# Patient Record
Sex: FEMALE | Race: ASIAN | URBAN - METROPOLITAN AREA
[De-identification: names, ages, dates, MRNs, and addresses within clinical notes are randomized per-mention and may not be internally consistent; named-entity substitution may affect disease eponyms.]

---

## 2019-05-14 NOTE — LETTER
Faina Jones, :1975    CONSENT FOR PROCEDURE/SEDATION    1. I authorize the performance upon Faina Jones  the following: Endometrial biopsy procedure    2. I authorize Dr. Diana Dean MD (and whomever is designated as the doctor’s assistant), to perform the above-mentioned procedures.    3. If any unforeseen conditions arise during this procedure calling for additional  procedures, operations, or medications (including anesthesia and blood transfusion), I further request and authorize the doctor to do whatever he/she deems advisable in my interest.    4. I consent to the taking and reproduction of any photographs in the course of this procedure for professional purposes.    5. I consent to the administration of such sedation as may be considered necessary or advisable by the physician responsible for this service, with the exception of ______________________________________________________    6. I have been informed by my doctor of the nature and purpose of this procedure sedation, possible alternative methods of treatment, risk involved and possible complications.    7. If I have a Do Not Resuscitate (DNR) order in place, the physician and I (or the individual authorized to consent on my behalf) will discuss and agree as to whether the Do Not Resuscitate (DNR) order will remain in effect or will be discontinued during the performance of the procedure and the applicable recovery period. If the Do Not Resuscitate (DNR) order is discontinued and is to be reinstated following the procedure/recovery period, the physician will determine when the applicable recovery period ends for purposes of reinstating the Do Not Resuscitate (DNR) order.    Signature of Patient:_______________________________________________    Signature of person authorized to consent for patient:  _______________________________________________________________    Relationship to patient:  ____________________________________________    Witness: _________________________________________ Date:___________     Physician Signature: _______________________________ Date:___________     Nausea and vomiting that does not stop/Pain not relieved by Medications/Bleeding that does not stop/Fever greater than (need to indicate Fahrenheit or Celsius)/Wound/Surgical Site with redness, or foul smelling discharge or pus/Numbness, tingling, color or temperature change to extremity/Inability to tolerate liquids or foods/Swelling that gets worse Swelling that gets worse/Fever greater than (need to indicate Fahrenheit or Celsius)/Wound/Surgical Site with redness, or foul smelling discharge or pus/Numbness, tingling, color or temperature change to extremity/Bleeding that does not stop/Pain not relieved by Medications/Inability to tolerate liquids or foods/Unable to urinate/Increased irritability or sluggishness/Nausea and vomiting that does not stop

## 2022-03-07 ENCOUNTER — OFFICE VISIT (OUTPATIENT)
Dept: FAMILY MEDICINE CLINIC | Facility: CLINIC | Age: 47
End: 2022-03-07
Payer: COMMERCIAL

## 2022-03-07 VITALS
DIASTOLIC BLOOD PRESSURE: 80 MMHG | RESPIRATION RATE: 16 BRPM | OXYGEN SATURATION: 99 % | TEMPERATURE: 98 F | HEIGHT: 59.5 IN | BODY MASS INDEX: 34.02 KG/M2 | SYSTOLIC BLOOD PRESSURE: 124 MMHG | HEART RATE: 88 BPM | WEIGHT: 171 LBS

## 2022-03-07 DIAGNOSIS — D50.8 OTHER IRON DEFICIENCY ANEMIA: ICD-10-CM

## 2022-03-07 DIAGNOSIS — K04.7 TOOTH INFECTION: ICD-10-CM

## 2022-03-07 DIAGNOSIS — M67.912 ROTATOR CUFF DYSFUNCTION, LEFT: Primary | ICD-10-CM

## 2022-03-07 DIAGNOSIS — G89.29 CHRONIC LEFT SHOULDER PAIN: ICD-10-CM

## 2022-03-07 DIAGNOSIS — M25.512 CHRONIC LEFT SHOULDER PAIN: ICD-10-CM

## 2022-03-07 DIAGNOSIS — Z12.31 VISIT FOR SCREENING MAMMOGRAM: ICD-10-CM

## 2022-03-07 PROCEDURE — 3074F SYST BP LT 130 MM HG: CPT | Performed by: FAMILY MEDICINE

## 2022-03-07 PROCEDURE — 99204 OFFICE O/P NEW MOD 45 MIN: CPT | Performed by: FAMILY MEDICINE

## 2022-03-07 PROCEDURE — 3008F BODY MASS INDEX DOCD: CPT | Performed by: FAMILY MEDICINE

## 2022-03-07 PROCEDURE — 3079F DIAST BP 80-89 MM HG: CPT | Performed by: FAMILY MEDICINE

## 2022-03-07 RX ORDER — METHOCARBAMOL 750 MG/1
1 TABLET, FILM COATED ORAL 2 TIMES DAILY
COMMUNITY
Start: 2022-02-25

## 2022-03-07 RX ORDER — AMOXICILLIN AND CLAVULANATE POTASSIUM 875; 125 MG/1; MG/1
1 TABLET, FILM COATED ORAL 2 TIMES DAILY
Qty: 14 TABLET | Refills: 0 | Status: SHIPPED | OUTPATIENT
Start: 2022-03-07 | End: 2022-03-14

## 2022-03-07 RX ORDER — TIZANIDINE 4 MG/1
4 TABLET ORAL NIGHTLY
COMMUNITY
Start: 2022-02-07

## 2022-03-07 RX ORDER — MELOXICAM 15 MG/1
15 TABLET ORAL DAILY PRN
Qty: 30 TABLET | Refills: 0 | Status: SHIPPED | OUTPATIENT
Start: 2022-03-07 | End: 2022-04-06

## 2022-03-30 ENCOUNTER — TELEPHONE (OUTPATIENT)
Dept: PHYSICAL THERAPY | Facility: HOSPITAL | Age: 47
End: 2022-03-30

## 2022-04-01 ENCOUNTER — TELEPHONE (OUTPATIENT)
Dept: PHYSICAL THERAPY | Facility: HOSPITAL | Age: 47
End: 2022-04-01

## 2022-04-04 ENCOUNTER — APPOINTMENT (OUTPATIENT)
Dept: PHYSICAL THERAPY | Age: 47
End: 2022-04-04
Attending: FAMILY MEDICINE
Payer: COMMERCIAL

## 2022-04-05 RX ORDER — MELOXICAM 15 MG/1
TABLET ORAL
Qty: 30 TABLET | Refills: 2 | Status: SHIPPED | OUTPATIENT
Start: 2022-04-05

## 2022-04-08 ENCOUNTER — OFFICE VISIT (OUTPATIENT)
Dept: PHYSICAL THERAPY | Age: 47
End: 2022-04-08
Attending: FAMILY MEDICINE
Payer: COMMERCIAL

## 2022-04-08 PROCEDURE — 97110 THERAPEUTIC EXERCISES: CPT

## 2022-04-08 PROCEDURE — 97162 PT EVAL MOD COMPLEX 30 MIN: CPT

## 2022-04-11 ENCOUNTER — OFFICE VISIT (OUTPATIENT)
Dept: PHYSICAL THERAPY | Age: 47
End: 2022-04-11
Attending: FAMILY MEDICINE
Payer: COMMERCIAL

## 2022-04-11 PROCEDURE — 97140 MANUAL THERAPY 1/> REGIONS: CPT

## 2022-04-11 PROCEDURE — 97110 THERAPEUTIC EXERCISES: CPT

## 2022-04-15 ENCOUNTER — OFFICE VISIT (OUTPATIENT)
Dept: PHYSICAL THERAPY | Age: 47
End: 2022-04-15
Attending: FAMILY MEDICINE
Payer: COMMERCIAL

## 2022-04-15 PROCEDURE — 97110 THERAPEUTIC EXERCISES: CPT

## 2022-04-18 ENCOUNTER — OFFICE VISIT (OUTPATIENT)
Dept: PHYSICAL THERAPY | Age: 47
End: 2022-04-18
Attending: FAMILY MEDICINE
Payer: COMMERCIAL

## 2022-04-18 PROCEDURE — 97110 THERAPEUTIC EXERCISES: CPT

## 2022-04-18 PROCEDURE — 97140 MANUAL THERAPY 1/> REGIONS: CPT

## 2022-04-20 ENCOUNTER — TELEPHONE (OUTPATIENT)
Dept: PHYSICAL THERAPY | Facility: HOSPITAL | Age: 47
End: 2022-04-20

## 2022-04-22 ENCOUNTER — OFFICE VISIT (OUTPATIENT)
Dept: PHYSICAL THERAPY | Age: 47
End: 2022-04-22
Attending: FAMILY MEDICINE
Payer: COMMERCIAL

## 2022-04-22 PROCEDURE — 97110 THERAPEUTIC EXERCISES: CPT

## 2022-04-25 ENCOUNTER — OFFICE VISIT (OUTPATIENT)
Dept: PHYSICAL THERAPY | Age: 47
End: 2022-04-25
Attending: FAMILY MEDICINE
Payer: COMMERCIAL

## 2022-04-25 PROCEDURE — 97110 THERAPEUTIC EXERCISES: CPT

## 2022-04-25 PROCEDURE — 97140 MANUAL THERAPY 1/> REGIONS: CPT

## 2022-04-29 ENCOUNTER — TELEPHONE (OUTPATIENT)
Dept: PHYSICAL THERAPY | Facility: HOSPITAL | Age: 47
End: 2022-04-29

## 2022-04-29 ENCOUNTER — APPOINTMENT (OUTPATIENT)
Dept: PHYSICAL THERAPY | Age: 47
End: 2022-04-29
Attending: FAMILY MEDICINE
Payer: COMMERCIAL

## 2022-05-03 ENCOUNTER — OFFICE VISIT (OUTPATIENT)
Dept: PHYSICAL THERAPY | Age: 47
End: 2022-05-03
Attending: FAMILY MEDICINE
Payer: COMMERCIAL

## 2022-05-03 PROCEDURE — 97110 THERAPEUTIC EXERCISES: CPT

## 2022-05-06 ENCOUNTER — APPOINTMENT (OUTPATIENT)
Dept: PHYSICAL THERAPY | Age: 47
End: 2022-05-06
Payer: COMMERCIAL

## 2022-07-12 ENCOUNTER — HOSPITAL ENCOUNTER (OUTPATIENT)
Dept: GENERAL RADIOLOGY | Age: 47
Discharge: HOME | End: 2022-07-12
Attending: FAMILY MEDICINE
Payer: COMMERCIAL

## 2022-07-12 ENCOUNTER — PATIENT MESSAGE (OUTPATIENT)
Dept: FAMILY MEDICINE CLINIC | Facility: CLINIC | Age: 47
End: 2022-07-12

## 2022-07-12 ENCOUNTER — OFFICE VISIT (OUTPATIENT)
Dept: FAMILY MEDICINE CLINIC | Facility: CLINIC | Age: 47
End: 2022-07-12
Payer: COMMERCIAL

## 2022-07-12 ENCOUNTER — LAB ENCOUNTER (OUTPATIENT)
Dept: LAB | Age: 47
End: 2022-07-12
Attending: FAMILY MEDICINE
Payer: COMMERCIAL

## 2022-07-12 VITALS
SYSTOLIC BLOOD PRESSURE: 122 MMHG | TEMPERATURE: 98 F | DIASTOLIC BLOOD PRESSURE: 70 MMHG | RESPIRATION RATE: 16 BRPM | WEIGHT: 172 LBS | OXYGEN SATURATION: 98 % | HEIGHT: 59.5 IN | HEART RATE: 74 BPM | BODY MASS INDEX: 34.22 KG/M2

## 2022-07-12 DIAGNOSIS — Z01.818 PREOP EXAMINATION: ICD-10-CM

## 2022-07-12 DIAGNOSIS — M67.912 ROTATOR CUFF DYSFUNCTION, LEFT: ICD-10-CM

## 2022-07-12 DIAGNOSIS — Z01.818 PREOP EXAMINATION: Primary | ICD-10-CM

## 2022-07-12 LAB
ALBUMIN SERPL-MCNC: 3.4 G/DL (ref 3.4–5)
ALBUMIN/GLOB SERPL: 0.8 {RATIO} (ref 1–2)
ALP LIVER SERPL-CCNC: 79 U/L
ALT SERPL-CCNC: 20 U/L
ANION GAP SERPL CALC-SCNC: 4 MMOL/L (ref 0–18)
AST SERPL-CCNC: 12 U/L (ref 15–37)
BASOPHILS # BLD AUTO: 0.04 X10(3) UL (ref 0–0.2)
BASOPHILS NFR BLD AUTO: 0.7 %
BILIRUB SERPL-MCNC: 0.2 MG/DL (ref 0.1–2)
BUN BLD-MCNC: 8 MG/DL (ref 7–18)
CALCIUM BLD-MCNC: 8.8 MG/DL (ref 8.5–10.1)
CHLORIDE SERPL-SCNC: 110 MMOL/L (ref 98–112)
CHOLEST SERPL-MCNC: 178 MG/DL (ref ?–200)
CO2 SERPL-SCNC: 25 MMOL/L (ref 21–32)
CREAT BLD-MCNC: 0.77 MG/DL
DEPRECATED HBV CORE AB SER IA-ACNC: 9.7 NG/ML
EOSINOPHIL # BLD AUTO: 0.07 X10(3) UL (ref 0–0.7)
EOSINOPHIL NFR BLD AUTO: 1.3 %
ERYTHROCYTE [DISTWIDTH] IN BLOOD BY AUTOMATED COUNT: 13.5 %
FASTING PATIENT LIPID ANSWER: YES
FASTING STATUS PATIENT QL REPORTED: YES
GLOBULIN PLAS-MCNC: 4.1 G/DL (ref 2.8–4.4)
GLUCOSE BLD-MCNC: 91 MG/DL (ref 70–99)
HCT VFR BLD AUTO: 35.8 %
HDLC SERPL-MCNC: 70 MG/DL (ref 40–59)
HGB BLD-MCNC: 11.5 G/DL
IMM GRANULOCYTES # BLD AUTO: 0.03 X10(3) UL (ref 0–1)
IMM GRANULOCYTES NFR BLD: 0.5 %
IRON SATN MFR SERPL: 8 %
IRON SERPL-MCNC: 34 UG/DL
LDLC SERPL CALC-MCNC: 90 MG/DL (ref ?–100)
LYMPHOCYTES # BLD AUTO: 1.16 X10(3) UL (ref 1–4)
LYMPHOCYTES NFR BLD AUTO: 20.8 %
MCH RBC QN AUTO: 28.6 PG (ref 26–34)
MCHC RBC AUTO-ENTMCNC: 32.1 G/DL (ref 31–37)
MCV RBC AUTO: 89.1 FL
MONOCYTES # BLD AUTO: 0.28 X10(3) UL (ref 0.1–1)
MONOCYTES NFR BLD AUTO: 5 %
NEUTROPHILS # BLD AUTO: 4 X10 (3) UL (ref 1.5–7.7)
NEUTROPHILS # BLD AUTO: 4 X10(3) UL (ref 1.5–7.7)
NEUTROPHILS NFR BLD AUTO: 71.7 %
NONHDLC SERPL-MCNC: 108 MG/DL (ref ?–130)
OSMOLALITY SERPL CALC.SUM OF ELEC: 286 MOSM/KG (ref 275–295)
PLATELET # BLD AUTO: 348 10(3)UL (ref 150–450)
POTASSIUM SERPL-SCNC: 4 MMOL/L (ref 3.5–5.1)
PROT SERPL-MCNC: 7.5 G/DL (ref 6.4–8.2)
RBC # BLD AUTO: 4.02 X10(6)UL
SODIUM SERPL-SCNC: 139 MMOL/L (ref 136–145)
T4 FREE SERPL-MCNC: 1.2 NG/DL (ref 0.8–1.7)
TIBC SERPL-MCNC: 441 UG/DL (ref 240–450)
TRANSFERRIN SERPL-MCNC: 296 MG/DL (ref 200–360)
TRIGL SERPL-MCNC: 103 MG/DL (ref 30–149)
TSI SER-ACNC: 0.51 MIU/ML (ref 0.36–3.74)
VLDLC SERPL CALC-MCNC: 17 MG/DL (ref 0–30)
WBC # BLD AUTO: 5.6 X10(3) UL (ref 4–11)

## 2022-07-12 PROCEDURE — 83550 IRON BINDING TEST: CPT | Performed by: FAMILY MEDICINE

## 2022-07-12 PROCEDURE — 80061 LIPID PANEL: CPT | Performed by: FAMILY MEDICINE

## 2022-07-12 PROCEDURE — 84443 ASSAY THYROID STIM HORMONE: CPT | Performed by: FAMILY MEDICINE

## 2022-07-12 PROCEDURE — 83540 ASSAY OF IRON: CPT | Performed by: FAMILY MEDICINE

## 2022-07-12 PROCEDURE — 82728 ASSAY OF FERRITIN: CPT | Performed by: FAMILY MEDICINE

## 2022-07-12 PROCEDURE — 84439 ASSAY OF FREE THYROXINE: CPT | Performed by: FAMILY MEDICINE

## 2022-07-12 PROCEDURE — 71046 X-RAY EXAM CHEST 2 VIEWS: CPT | Performed by: FAMILY MEDICINE

## 2022-07-12 PROCEDURE — 80050 GENERAL HEALTH PANEL: CPT | Performed by: FAMILY MEDICINE

## 2022-07-12 NOTE — TELEPHONE ENCOUNTER
From: Faina Jones  To:  Rekha Phillips DO  Sent: 7/12/2022 8:04 AM CDT  Subject: MRI ARTHROGRAM LEFT SHOULDER    Left shoulder report

## 2022-07-13 ENCOUNTER — PATIENT MESSAGE (OUTPATIENT)
Dept: FAMILY MEDICINE CLINIC | Facility: CLINIC | Age: 47
End: 2022-07-13

## 2022-07-14 ENCOUNTER — TELEPHONE (OUTPATIENT)
Dept: FAMILY MEDICINE CLINIC | Facility: CLINIC | Age: 47
End: 2022-07-14

## 2022-07-14 DIAGNOSIS — D50.8 OTHER IRON DEFICIENCY ANEMIA: Primary | ICD-10-CM

## 2022-07-14 NOTE — TELEPHONE ENCOUNTER
Spoke with Dr. Kye Suarez, pt may continue ferrous gluconate at current dose until surgery and then resume after surgery.

## 2022-07-14 NOTE — TELEPHONE ENCOUNTER
From: Faina Jones  To: Primitivo Barry DO  Sent: 7/13/2022 6:13 PM CDT  Subject: Low iron and ferritin level    Aoa Doc. Can you kindly let me know if I continue my ferrous gloconate 27 mg 2 times daily with a 1 tab daily (named as:Core 1) multi vitamin contain iron 9mg. Or should I stop it for my surgery on thursday july 21st 2022. Pls advice. Sending you pics of the supplements for your reference.   Thanks

## 2022-07-14 NOTE — TELEPHONE ENCOUNTER
Patient calling, patient received message about low iron from labs done on 7-12. Patient has upcoming procedure on 7-21 with Dr Larissa Sandoval, has questions about medication-how much to take, should she discontinue or ok to still take leading up to her surgery.  Please advise

## 2022-07-18 ENCOUNTER — TELEPHONE (OUTPATIENT)
Dept: FAMILY MEDICINE CLINIC | Facility: CLINIC | Age: 47
End: 2022-07-18

## 2022-07-18 NOTE — TELEPHONE ENCOUNTER
EKG tracing faxed to Dr. Christiano Rodrigez at [facility] AT [address] [place] [address], [phone], confirmation received.

## 2022-07-18 NOTE — TELEPHONE ENCOUNTER
- EKG tracing is needed to be faxed to 's office. Everything else was rec'd.       [facility] Fax [phone]

## 2022-07-25 ENCOUNTER — HOSPITAL ENCOUNTER (OUTPATIENT)
Dept: ULTRASOUND IMAGING | Age: 47
Discharge: HOME | End: 2022-07-25
Attending: PHYSICIAN ASSISTANT
Payer: COMMERCIAL

## 2022-07-25 DIAGNOSIS — R22.32 MASS OF UPPER LIMB, LEFT: ICD-10-CM

## 2022-07-25 PROCEDURE — 93971 EXTREMITY STUDY: CPT | Performed by: PHYSICIAN ASSISTANT

## 2022-08-12 ENCOUNTER — OFFICE VISIT (OUTPATIENT)
Dept: FAMILY MEDICINE CLINIC | Facility: CLINIC | Age: 47
End: 2022-08-12
Payer: COMMERCIAL

## 2022-08-12 VITALS
DIASTOLIC BLOOD PRESSURE: 74 MMHG | BODY MASS INDEX: 35.41 KG/M2 | OXYGEN SATURATION: 99 % | HEART RATE: 72 BPM | HEIGHT: 59.5 IN | SYSTOLIC BLOOD PRESSURE: 124 MMHG | TEMPERATURE: 98 F | WEIGHT: 178 LBS | RESPIRATION RATE: 16 BRPM

## 2022-08-12 DIAGNOSIS — Z98.890 POST-OPERATIVE STATE: Primary | ICD-10-CM

## 2022-08-12 DIAGNOSIS — Z30.011 ENCOUNTER FOR INITIAL PRESCRIPTION OF CONTRACEPTIVE PILLS: ICD-10-CM

## 2022-08-12 DIAGNOSIS — Z12.31 ENCOUNTER FOR SCREENING MAMMOGRAM FOR MALIGNANT NEOPLASM OF BREAST: ICD-10-CM

## 2022-08-12 DIAGNOSIS — Z12.11 SCREENING FOR COLON CANCER: ICD-10-CM

## 2022-08-12 DIAGNOSIS — G89.18 POST-OP PAIN: ICD-10-CM

## 2022-08-12 PROCEDURE — 3074F SYST BP LT 130 MM HG: CPT | Performed by: FAMILY MEDICINE

## 2022-08-12 PROCEDURE — 99214 OFFICE O/P EST MOD 30 MIN: CPT | Performed by: FAMILY MEDICINE

## 2022-08-12 PROCEDURE — 3078F DIAST BP <80 MM HG: CPT | Performed by: FAMILY MEDICINE

## 2022-08-12 PROCEDURE — 3008F BODY MASS INDEX DOCD: CPT | Performed by: FAMILY MEDICINE

## 2022-08-12 RX ORDER — ACETAMINOPHEN AND CODEINE PHOSPHATE 300; 30 MG/1; MG/1
1 TABLET ORAL
COMMUNITY
Start: 2022-08-02 | End: 2022-08-12

## 2022-08-12 RX ORDER — ASPIRIN 81 MG/1
TABLET ORAL
COMMUNITY
Start: 2022-07-21

## 2022-08-12 RX ORDER — NORETHINDRONE ACETATE AND ETHINYL ESTRADIOL 1MG-20(21)
1 KIT ORAL DAILY
Qty: 28 TABLET | Refills: 2 | Status: SHIPPED | OUTPATIENT
Start: 2022-08-19 | End: 2023-08-19

## 2022-08-12 RX ORDER — ACETAMINOPHEN AND CODEINE PHOSPHATE 300; 30 MG/1; MG/1
1 TABLET ORAL
Qty: 30 TABLET | Refills: 0 | Status: SHIPPED | OUTPATIENT
Start: 2022-08-12

## 2022-08-12 RX ORDER — CYCLOBENZAPRINE HCL 5 MG
5 TABLET ORAL NIGHTLY PRN
Qty: 10 TABLET | Refills: 0 | Status: SHIPPED | OUTPATIENT
Start: 2022-08-12

## 2022-08-15 ENCOUNTER — PATIENT MESSAGE (OUTPATIENT)
Dept: FAMILY MEDICINE CLINIC | Facility: CLINIC | Age: 47
End: 2022-08-15

## 2022-08-16 NOTE — TELEPHONE ENCOUNTER
From: Faina Jones  To: Jihan Boo DO  Sent: 8/15/2022 4:42 PM CDT  Subject: screening Colonoscopy     Good evening   I got a call from colonoscopy department for scheduling appointment.  As you know I had surgery recently I am not ready for any further tests and screenings as I am in recovery phase. Pls confirm its a regular screening that I can do at later dates.   Hopefully you understand   Thanks  Faina

## 2022-08-17 ENCOUNTER — TELEPHONE (OUTPATIENT)
Dept: FAMILY MEDICINE CLINIC | Facility: CLINIC | Age: 47
End: 2022-08-17

## 2022-08-17 NOTE — TELEPHONE ENCOUNTER
Cologuard order with insurance card and face sheet faxed to [phone]. Copy of order sent to scan.

## 2022-08-17 NOTE — TELEPHONE ENCOUNTER
Patient calling, patient states she is still trying to recover post surgery in July. Patient also declines Cologuard at this time, will do test when better.

## 2022-08-20 ENCOUNTER — PATIENT MESSAGE (OUTPATIENT)
Dept: FAMILY MEDICINE CLINIC | Facility: CLINIC | Age: 47
End: 2022-08-20

## 2022-08-20 ENCOUNTER — TELEPHONE (OUTPATIENT)
Dept: FAMILY MEDICINE CLINIC | Facility: CLINIC | Age: 47
End: 2022-08-20

## 2022-08-20 DIAGNOSIS — U07.1 COVID-19 VIRUS INFECTION: Primary | ICD-10-CM

## 2022-08-20 LAB — AMB EXT COVID-19 RESULT: DETECTED

## 2022-08-20 NOTE — TELEPHONE ENCOUNTER
On call 11:31AM- tested positive for covid and asking for treatment. Had shoulder surgery 4 wks ago. No other chronic medical conditions. Tested positive today, symptoms started yesterday. Taking tylenol #3    Sxs: nasal congestion, cough   pulse ox 98%  Denies- fever    Rec- Advised her it is an option to take it. Pt hesitant as her sxs are mild.  Due to her obesity erxed prescription and advised her to start by day 5 if she chooses to take it.  -reviewed signs/sxs that would indicate need to call back/go to ER

## 2022-08-22 NOTE — TELEPHONE ENCOUNTER
From: Faina Jones  To: Tiffanie Mcnamara DO  Sent: 8/20/2022 3:27 PM CDT  Subject: Covid positive    Talked to On call Doctor. She prescribed Anti viral incase symptoms get worst can start within 5 days.   Symptoms started on Friday 19th August 2022

## 2022-08-29 ENCOUNTER — PATIENT MESSAGE (OUTPATIENT)
Dept: FAMILY MEDICINE CLINIC | Facility: CLINIC | Age: 47
End: 2022-08-29

## 2022-08-30 NOTE — TELEPHONE ENCOUNTER
From: Faina Jones  To: Mary Wilkinson DO  Sent: 8/29/2022 10:10 AM CDT  Subject: Post covid nausea and heartburn    Good morning Doctor,  Today is 11 day post covid. I have moderate to severe heartburn and nausea since few days(5-6 katt) after covid starts, no home remedy works. I drink peptobismol last night and today's morning, keep hydrating me  Bowl movement, soft to loose once or twice daily  Sometime watery sometime soft. Kindly prescribed or advise me some overcounter med.  For heartburn and nausea feeling  Thanks

## 2023-01-09 ENCOUNTER — TELEPHONE (OUTPATIENT)
Dept: FAMILY MEDICINE CLINIC | Facility: CLINIC | Age: 48
End: 2023-01-09

## 2023-01-09 NOTE — TELEPHONE ENCOUNTER
Pt came in with son for new patient appt today, wants to come in sooner than march appt. Stating she has been experiencing irregular bleeding that has been on and off over the last month or so. She does have history of fibroids.  Please advise    FYI- march appt is to establish care with   Future Appointments   Date Time Provider Yuliya Cash   1/30/2023 12:30 PM Kavin Arvizu MD [facility]   3/13/2023 10:00 AM aSpna Sen DO [facility]

## 2023-01-10 NOTE — TELEPHONE ENCOUNTER
Called patient and explained Dr. Young Bella is on JE right now. She should keep appt with OB/GYN and then we will see her when Dr. Young Bella returns for JE and establish care as PCP. Explained Dr. Young Bella would probably refer her to GYN about this issue so keep scheduled appt with them. Verbalizes understanding.     Future Appointments   Date Time Provider Yuliya Cash   1/30/2023 12:30 PM Jairo Aguilar MD [facility]   3/13/2023 10:00 AM Estee Daniel DO [facility]

## 2023-01-30 ENCOUNTER — OFFICE VISIT (OUTPATIENT)
Facility: CLINIC | Age: 48
End: 2023-01-30
Payer: COMMERCIAL

## 2023-01-30 VITALS
SYSTOLIC BLOOD PRESSURE: 113 MMHG | HEIGHT: 59.5 IN | HEART RATE: 118 BPM | WEIGHT: 175 LBS | DIASTOLIC BLOOD PRESSURE: 65 MMHG | BODY MASS INDEX: 34.81 KG/M2

## 2023-01-30 DIAGNOSIS — N92.6 IRREGULAR PERIODS: Primary | ICD-10-CM

## 2023-01-30 DIAGNOSIS — Z12.4 PAPANICOLAOU SMEAR FOR CERVICAL CANCER SCREENING: ICD-10-CM

## 2023-01-30 DIAGNOSIS — Z87.42 HX OF MENORRHAGIA: ICD-10-CM

## 2023-01-30 LAB
CONTROL LINE PRESENT WITH A CLEAR BACKGROUND (YES/NO): YES YES/NO
PREGNANCY TEST, URINE: NEGATIVE

## 2023-01-30 PROCEDURE — 88305 TISSUE EXAM BY PATHOLOGIST: CPT | Performed by: OBSTETRICS & GYNECOLOGY

## 2023-01-30 NOTE — PROGRESS NOTES
Endometrial Biopsy     Pre-Procedure Care:   Consent was obtained. Procedure/risks were explained. Questions were answered. Correct patient was identified. Correct side and site were confirmed. Pregnancy Results: negative from urine test   Birth control method(s) used:  Condoms    Indication: Menorrhagia and prolonged vaginal bleeding        Description of Procedure:   A bivalve speculum was placed in the vagina and the cervix was prepped with betadine solution. Single tooth tenaculum placed at the 12 o'clock position. The endometrial cavity was curetted for pipelle tissue sampling with 1 passes. Specimen was sent to pathology. The single tooth tenaculum was removed. Good hemostasis was noted. There were no complications. There was no blood loss. Discharge instructions were provided to the patient. Visit Plan:    Await final pathology prior to treatment.

## 2023-02-02 ENCOUNTER — TELEPHONE (OUTPATIENT)
Facility: CLINIC | Age: 48
End: 2023-02-02

## 2023-02-02 NOTE — TELEPHONE ENCOUNTER
Pathology benign per KD, Pap smear pending. Patient verbalized understanding, agreed to and intend to comply with plan of care.

## 2023-02-08 LAB
HPV I/H RISK 1 DNA SPEC QL NAA+PROBE: NEGATIVE
LAST PAP RESULT: NORMAL

## 2023-02-17 ENCOUNTER — HOSPITAL ENCOUNTER (OUTPATIENT)
Dept: GENERAL RADIOLOGY | Age: 48
Discharge: HOME | End: 2023-02-17
Attending: NURSE PRACTITIONER
Payer: COMMERCIAL

## 2023-02-17 ENCOUNTER — OFFICE VISIT (OUTPATIENT)
Dept: FAMILY MEDICINE CLINIC | Facility: CLINIC | Age: 48
End: 2023-02-17

## 2023-02-17 VITALS
DIASTOLIC BLOOD PRESSURE: 84 MMHG | HEART RATE: 108 BPM | HEIGHT: 59 IN | WEIGHT: 175 LBS | OXYGEN SATURATION: 99 % | RESPIRATION RATE: 18 BRPM | TEMPERATURE: 98 F | SYSTOLIC BLOOD PRESSURE: 126 MMHG | BODY MASS INDEX: 35.28 KG/M2

## 2023-02-17 DIAGNOSIS — Z11.1 SCREENING-PULMONARY TB: Primary | ICD-10-CM

## 2023-02-17 DIAGNOSIS — Z11.1 SCREENING-PULMONARY TB: ICD-10-CM

## 2023-02-17 DIAGNOSIS — Z02.89 ENCOUNTER FOR PHYSICAL EXAMINATION RELATED TO EMPLOYMENT: ICD-10-CM

## 2023-02-17 PROCEDURE — 71046 X-RAY EXAM CHEST 2 VIEWS: CPT | Performed by: NURSE PRACTITIONER

## 2023-02-23 ENCOUNTER — HOSPITAL ENCOUNTER (EMERGENCY)
Age: 48
Discharge: HOME | End: 2023-02-23
Attending: EMERGENCY MEDICINE
Payer: COMMERCIAL

## 2023-02-23 VITALS
WEIGHT: 165.38 LBS | RESPIRATION RATE: 16 BRPM | DIASTOLIC BLOOD PRESSURE: 91 MMHG | SYSTOLIC BLOOD PRESSURE: 153 MMHG | BODY MASS INDEX: 32.47 KG/M2 | HEART RATE: 101 BPM | OXYGEN SATURATION: 100 % | TEMPERATURE: 98 F | HEIGHT: 60 IN

## 2023-02-23 DIAGNOSIS — H81.10 BENIGN PAROXYSMAL POSITIONAL VERTIGO, UNSPECIFIED LATERALITY: Primary | ICD-10-CM

## 2023-02-23 LAB
ALBUMIN SERPL-MCNC: 3.1 G/DL (ref 3.4–5)
ALBUMIN/GLOB SERPL: 0.7 {RATIO} (ref 1–2)
ALP LIVER SERPL-CCNC: 78 U/L
ALT SERPL-CCNC: 22 U/L
ANION GAP SERPL CALC-SCNC: 7 MMOL/L (ref 0–18)
AST SERPL-CCNC: 15 U/L (ref 15–37)
ATRIAL RATE: 98 BPM
BASOPHILS # BLD AUTO: 0.04 X10(3) UL (ref 0–0.2)
BASOPHILS NFR BLD AUTO: 0.6 %
BILIRUB SERPL-MCNC: 0.1 MG/DL (ref 0.1–2)
BUN BLD-MCNC: 10 MG/DL (ref 7–18)
CALCIUM BLD-MCNC: 8.5 MG/DL (ref 8.5–10.1)
CHLORIDE SERPL-SCNC: 109 MMOL/L (ref 98–112)
CO2 SERPL-SCNC: 23 MMOL/L (ref 21–32)
CREAT BLD-MCNC: 0.9 MG/DL
EOSINOPHIL # BLD AUTO: 0.17 X10(3) UL (ref 0–0.7)
EOSINOPHIL NFR BLD AUTO: 2.4 %
ERYTHROCYTE [DISTWIDTH] IN BLOOD BY AUTOMATED COUNT: 14.3 %
GFR SERPLBLD BASED ON 1.73 SQ M-ARVRAT: 80 ML/MIN/1.73M2 (ref 60–?)
GLOBULIN PLAS-MCNC: 4.7 G/DL (ref 2.8–4.4)
GLUCOSE BLD-MCNC: 117 MG/DL (ref 70–99)
HCT VFR BLD AUTO: 33.6 %
HGB BLD-MCNC: 10.7 G/DL
IMM GRANULOCYTES # BLD AUTO: 0.02 X10(3) UL (ref 0–1)
IMM GRANULOCYTES NFR BLD: 0.3 %
LYMPHOCYTES # BLD AUTO: 1.86 X10(3) UL (ref 1–4)
LYMPHOCYTES NFR BLD AUTO: 26.3 %
MCH RBC QN AUTO: 25.6 PG (ref 26–34)
MCHC RBC AUTO-ENTMCNC: 31.8 G/DL (ref 31–37)
MCV RBC AUTO: 80.4 FL
MONOCYTES # BLD AUTO: 0.52 X10(3) UL (ref 0.1–1)
MONOCYTES NFR BLD AUTO: 7.3 %
NEUTROPHILS # BLD AUTO: 4.47 X10 (3) UL (ref 1.5–7.7)
NEUTROPHILS # BLD AUTO: 4.47 X10(3) UL (ref 1.5–7.7)
NEUTROPHILS NFR BLD AUTO: 63.1 %
OSMOLALITY SERPL CALC.SUM OF ELEC: 288 MOSM/KG (ref 275–295)
P AXIS: 59 DEGREES
P-R INTERVAL: 144 MS
PLATELET # BLD AUTO: 412 10(3)UL (ref 150–450)
POTASSIUM SERPL-SCNC: 3.8 MMOL/L (ref 3.5–5.1)
PROT SERPL-MCNC: 7.8 G/DL (ref 6.4–8.2)
Q-T INTERVAL: 356 MS
QRS DURATION: 68 MS
QTC CALCULATION (BEZET): 454 MS
R AXIS: 41 DEGREES
RBC # BLD AUTO: 4.18 X10(6)UL
SODIUM SERPL-SCNC: 139 MMOL/L (ref 136–145)
T AXIS: 46 DEGREES
VENTRICULAR RATE: 98 BPM
WBC # BLD AUTO: 7.1 X10(3) UL (ref 4–11)

## 2023-02-23 PROCEDURE — 80053 COMPREHEN METABOLIC PANEL: CPT | Performed by: EMERGENCY MEDICINE

## 2023-02-23 PROCEDURE — 96374 THER/PROPH/DIAG INJ IV PUSH: CPT

## 2023-02-23 PROCEDURE — 96361 HYDRATE IV INFUSION ADD-ON: CPT

## 2023-02-23 PROCEDURE — 93010 ELECTROCARDIOGRAM REPORT: CPT

## 2023-02-23 PROCEDURE — 99284 EMERGENCY DEPT VISIT MOD MDM: CPT

## 2023-02-23 PROCEDURE — 93005 ELECTROCARDIOGRAM TRACING: CPT

## 2023-02-23 PROCEDURE — 85025 COMPLETE CBC W/AUTO DIFF WBC: CPT | Performed by: EMERGENCY MEDICINE

## 2023-02-23 RX ORDER — DIPHENHYDRAMINE HYDROCHLORIDE 50 MG/ML
12.5 INJECTION INTRAMUSCULAR; INTRAVENOUS ONCE
Status: COMPLETED | OUTPATIENT
Start: 2023-02-23 | End: 2023-02-23

## 2023-02-23 RX ORDER — MECLIZINE HYDROCHLORIDE 25 MG/1
25 TABLET ORAL 3 TIMES DAILY PRN
Qty: 30 TABLET | Refills: 0 | Status: SHIPPED | OUTPATIENT
Start: 2023-02-23

## 2023-02-23 RX ORDER — MECLIZINE HYDROCHLORIDE 25 MG/1
25 TABLET ORAL ONCE
Status: COMPLETED | OUTPATIENT
Start: 2023-02-23 | End: 2023-02-23

## 2023-02-23 NOTE — ED INITIAL ASSESSMENT (HPI)
Patient woke up out of her sleep with dizziness, denies any blurry or double vision. Patient states, \"I was standing and fell into the bed, calling for my \". Patient denies any LOC. Patient is taking Novethin that she was prescribed by her OB MD for bleeding x 2 months, she states, \"I finished taking them on Monday, the first round of pills, currently taking the placebo pills in the same package\". Denies any N/V or photo sensitivity.

## 2023-02-25 ENCOUNTER — TELEPHONE (OUTPATIENT)
Dept: OBGYN CLINIC | Facility: CLINIC | Age: 48
End: 2023-02-25

## 2023-03-02 ENCOUNTER — OFFICE VISIT (OUTPATIENT)
Facility: CLINIC | Age: 48
End: 2023-03-02
Payer: COMMERCIAL

## 2023-03-02 VITALS
WEIGHT: 175 LBS | HEIGHT: 59 IN | DIASTOLIC BLOOD PRESSURE: 74 MMHG | SYSTOLIC BLOOD PRESSURE: 118 MMHG | HEART RATE: 108 BPM | BODY MASS INDEX: 35.28 KG/M2

## 2023-03-02 DIAGNOSIS — Z30.41 ENCOUNTER FOR SURVEILLANCE OF CONTRACEPTIVE PILLS: Primary | ICD-10-CM

## 2023-03-02 PROCEDURE — 3074F SYST BP LT 130 MM HG: CPT | Performed by: OBSTETRICS & GYNECOLOGY

## 2023-03-02 PROCEDURE — 99212 OFFICE O/P EST SF 10 MIN: CPT | Performed by: OBSTETRICS & GYNECOLOGY

## 2023-03-02 PROCEDURE — 3078F DIAST BP <80 MM HG: CPT | Performed by: OBSTETRICS & GYNECOLOGY

## 2023-03-02 PROCEDURE — 3008F BODY MASS INDEX DOCD: CPT | Performed by: OBSTETRICS & GYNECOLOGY

## 2023-03-02 RX ORDER — ACETAMINOPHEN AND CODEINE PHOSPHATE 120; 12 MG/5ML; MG/5ML
0.35 SOLUTION ORAL DAILY
Qty: 28 TABLET | Refills: 12 | Status: SHIPPED | OUTPATIENT
Start: 2023-03-02 | End: 2024-03-01

## 2023-03-02 NOTE — PROGRESS NOTES
He was started on the birth control pill she had a severe headache and vertigo went to be evaluated and they said her blood pressure was up. She stopped the birth control pills. She had a period after that was not very heavy. She has appointment with her primary in the middle of this month and blood pressure will be checked. We will start her on a progesterone only pill as desired.   Pap and endometrial biopsy were normal.

## 2023-03-27 ENCOUNTER — OFFICE VISIT (OUTPATIENT)
Dept: FAMILY MEDICINE CLINIC | Facility: CLINIC | Age: 48
End: 2023-03-27
Payer: COMMERCIAL

## 2023-03-27 VITALS
TEMPERATURE: 98 F | RESPIRATION RATE: 16 BRPM | HEART RATE: 103 BPM | HEIGHT: 60.63 IN | WEIGHT: 171.13 LBS | DIASTOLIC BLOOD PRESSURE: 88 MMHG | OXYGEN SATURATION: 99 % | SYSTOLIC BLOOD PRESSURE: 128 MMHG | BODY MASS INDEX: 32.73 KG/M2

## 2023-03-27 DIAGNOSIS — Z76.89 ESTABLISHING CARE WITH NEW DOCTOR, ENCOUNTER FOR: ICD-10-CM

## 2023-03-27 DIAGNOSIS — M25.512 CHRONIC LEFT SHOULDER PAIN: Primary | ICD-10-CM

## 2023-03-27 DIAGNOSIS — R03.0 ELEVATED BLOOD PRESSURE READING: ICD-10-CM

## 2023-03-27 DIAGNOSIS — G89.29 CHRONIC LEFT SHOULDER PAIN: Primary | ICD-10-CM

## 2023-03-27 DIAGNOSIS — H81.10 BENIGN PAROXYSMAL POSITIONAL VERTIGO, UNSPECIFIED LATERALITY: ICD-10-CM

## 2023-03-27 DIAGNOSIS — D50.8 OTHER IRON DEFICIENCY ANEMIA: ICD-10-CM

## 2023-03-27 PROCEDURE — 3008F BODY MASS INDEX DOCD: CPT | Performed by: FAMILY MEDICINE

## 2023-03-27 PROCEDURE — 3074F SYST BP LT 130 MM HG: CPT | Performed by: FAMILY MEDICINE

## 2023-03-27 PROCEDURE — 99214 OFFICE O/P EST MOD 30 MIN: CPT | Performed by: FAMILY MEDICINE

## 2023-03-27 PROCEDURE — 3079F DIAST BP 80-89 MM HG: CPT | Performed by: FAMILY MEDICINE

## 2023-03-27 RX ORDER — PEDIATRIC MULTIPLE VITAMINS W/ IRON CHEW TAB 18 MG 18 MG
18 CHEW TAB ORAL DAILY
COMMUNITY

## 2023-08-10 ENCOUNTER — OFFICE VISIT (OUTPATIENT)
Dept: FAMILY MEDICINE CLINIC | Facility: CLINIC | Age: 48
End: 2023-08-10
Payer: COMMERCIAL

## 2023-08-10 VITALS
HEIGHT: 60.63 IN | WEIGHT: 175.25 LBS | BODY MASS INDEX: 33.52 KG/M2 | HEART RATE: 89 BPM | TEMPERATURE: 98 F | SYSTOLIC BLOOD PRESSURE: 122 MMHG | DIASTOLIC BLOOD PRESSURE: 70 MMHG | OXYGEN SATURATION: 99 % | RESPIRATION RATE: 16 BRPM

## 2023-08-10 DIAGNOSIS — Z12.11 SCREENING FOR COLON CANCER: ICD-10-CM

## 2023-08-10 DIAGNOSIS — G47.30 SLEEP DISORDER BREATHING: ICD-10-CM

## 2023-08-10 DIAGNOSIS — Z23 NEED FOR VACCINATION: ICD-10-CM

## 2023-08-10 DIAGNOSIS — Z00.00 LABORATORY EXAM ORDERED AS PART OF ROUTINE GENERAL MEDICAL EXAMINATION: ICD-10-CM

## 2023-08-10 DIAGNOSIS — Z00.00 WELL WOMAN EXAM (NO GYNECOLOGICAL EXAM): Primary | ICD-10-CM

## 2023-08-10 DIAGNOSIS — Z12.31 VISIT FOR SCREENING MAMMOGRAM: ICD-10-CM

## 2023-08-10 DIAGNOSIS — D50.8 OTHER IRON DEFICIENCY ANEMIA: ICD-10-CM

## 2023-08-10 DIAGNOSIS — E66.9 OBESITY (BMI 30-39.9): ICD-10-CM

## 2023-08-10 DIAGNOSIS — R06.83 SNORING: ICD-10-CM

## 2023-08-10 DIAGNOSIS — R21 RASH AND NONSPECIFIC SKIN ERUPTION: ICD-10-CM

## 2023-08-10 PROCEDURE — 3074F SYST BP LT 130 MM HG: CPT | Performed by: FAMILY MEDICINE

## 2023-08-10 PROCEDURE — 99214 OFFICE O/P EST MOD 30 MIN: CPT | Performed by: FAMILY MEDICINE

## 2023-08-10 PROCEDURE — 3078F DIAST BP <80 MM HG: CPT | Performed by: FAMILY MEDICINE

## 2023-08-10 PROCEDURE — 90471 IMMUNIZATION ADMIN: CPT | Performed by: FAMILY MEDICINE

## 2023-08-10 PROCEDURE — 3008F BODY MASS INDEX DOCD: CPT | Performed by: FAMILY MEDICINE

## 2023-08-10 PROCEDURE — 90715 TDAP VACCINE 7 YRS/> IM: CPT | Performed by: FAMILY MEDICINE

## 2023-08-10 PROCEDURE — 99396 PREV VISIT EST AGE 40-64: CPT | Performed by: FAMILY MEDICINE

## 2023-08-10 RX ORDER — TRIAMCINOLONE ACETONIDE 1 MG/G
CREAM TOPICAL 2 TIMES DAILY
Qty: 60 G | Refills: 1 | Status: SHIPPED | OUTPATIENT
Start: 2023-08-10 | End: 2023-08-24

## 2023-11-17 ENCOUNTER — PATIENT MESSAGE (OUTPATIENT)
Facility: CLINIC | Age: 48
End: 2023-11-17

## 2023-11-20 NOTE — TELEPHONE ENCOUNTER
From: Li Jones  To: Aliya Gonzalez  Sent: 11/17/2023 4:55 PM CST  Subject: Bleeding since 18 days    Hi Doctor,   Hopefully you are doing great,   I am on OCP only progesterone pills one tab daily. I am bleeding since 18 days , some days light some days heavy and one day brown discharge and one day nothing. Now a days bleeding not heavy but needs to put on pad, some time clot. Pls advice what to do. I am traveling [facility] next week for Thanksgiving, kindly prescribed something to stop bleed. Thanks. Happy Thanksgiving!

## 2023-12-20 ENCOUNTER — TELEPHONE (OUTPATIENT)
Dept: FAMILY MEDICINE CLINIC | Facility: CLINIC | Age: 48
End: 2023-12-20

## 2023-12-20 ENCOUNTER — OFFICE VISIT (OUTPATIENT)
Dept: FAMILY MEDICINE CLINIC | Facility: CLINIC | Age: 48
End: 2023-12-20
Payer: COMMERCIAL

## 2023-12-20 VITALS
HEART RATE: 100 BPM | HEIGHT: 60.63 IN | RESPIRATION RATE: 16 BRPM | OXYGEN SATURATION: 98 % | TEMPERATURE: 99 F | BODY MASS INDEX: 32.42 KG/M2 | SYSTOLIC BLOOD PRESSURE: 124 MMHG | DIASTOLIC BLOOD PRESSURE: 80 MMHG | WEIGHT: 169.5 LBS

## 2023-12-20 DIAGNOSIS — J02.0 STREP PHARYNGITIS: Primary | ICD-10-CM

## 2023-12-20 DIAGNOSIS — R68.89 FLU-LIKE SYMPTOMS: ICD-10-CM

## 2023-12-20 DIAGNOSIS — Z20.828 EXPOSURE TO THE FLU: ICD-10-CM

## 2023-12-20 LAB
CONTROL LINE PRESENT WITH A CLEAR BACKGROUND (YES/NO): YES YES/NO
COVID19 BINAX NOW ANTIGEN: NOT DETECTED
KIT LOT #: 7282 NUMERIC
OPERATOR ID: NORMAL
STREP GRP A CUL-SCR: POSITIVE

## 2023-12-20 PROCEDURE — 87880 STREP A ASSAY W/OPTIC: CPT | Performed by: FAMILY MEDICINE

## 2023-12-20 PROCEDURE — 3008F BODY MASS INDEX DOCD: CPT | Performed by: FAMILY MEDICINE

## 2023-12-20 PROCEDURE — 3074F SYST BP LT 130 MM HG: CPT | Performed by: FAMILY MEDICINE

## 2023-12-20 PROCEDURE — 3079F DIAST BP 80-89 MM HG: CPT | Performed by: FAMILY MEDICINE

## 2023-12-20 PROCEDURE — 99214 OFFICE O/P EST MOD 30 MIN: CPT | Performed by: FAMILY MEDICINE

## 2023-12-20 RX ORDER — OSELTAMIVIR PHOSPHATE 75 MG/1
75 CAPSULE ORAL 2 TIMES DAILY
Qty: 10 CAPSULE | Refills: 0 | Status: SHIPPED | OUTPATIENT
Start: 2023-12-20 | End: 2023-12-25

## 2023-12-20 RX ORDER — AMOXICILLIN 500 MG/1
500 CAPSULE ORAL 2 TIMES DAILY
Qty: 20 CAPSULE | Refills: 0 | Status: SHIPPED | OUTPATIENT
Start: 2023-12-20 | End: 2023-12-30

## 2023-12-20 NOTE — TELEPHONE ENCOUNTER
[phone]   Called pt stating over the weekend,  dx with strep @. Covid and flu negative. Pt started with fever yesterday afternoon @101- alternating Advil and Tylenol. Then developed cough, body aches, and chills. Tried OTC Dayquil, vicks severe cold/flu, keeping hydrated.   Pt does not want to leave house due to sx and requesting to complete video visit- scheduled:  Future Appointments   Date Time Provider Department Center   12/20/2023 10:40 AM Jean Pierre Bright, DO [facility]     Informed will relay to PCP update, if indicates differently will call pt back.  No further questions or concerns. Pt verbalized understanding and agreed with POC.    NEENA.

## 2023-12-20 NOTE — TELEPHONE ENCOUNTER
Pt said her  was diagnosed with strep a few days ago and now she has same symptoms, sore throat, fever, body aches asking if  can see her or prescribe an antibiotic?

## 2024-01-19 ENCOUNTER — PATIENT MESSAGE (OUTPATIENT)
Dept: FAMILY MEDICINE CLINIC | Facility: CLINIC | Age: 49
End: 2024-01-19

## 2024-01-19 NOTE — TELEPHONE ENCOUNTER
From: Faina Jones  To: Jean Pierre Bright  Sent: 1/19/2024 8:17 AM CST  Subject: Annual Wellness blood test report attached    Hi Doctor,  Here I attached my latest blood work for wellness at my work.  Pls advice.  (I just had my periods finished for this month.  Started Jan 10th.)  Also I didn't get my payment needs to pay at [facility] with reference to my last visit at your clinic on Dec 20th  Thanks and have wonderful day   Faina Jones

## 2024-03-19 ENCOUNTER — LAB ENCOUNTER (OUTPATIENT)
Dept: LAB | Age: 49
End: 2024-03-19
Attending: FAMILY MEDICINE
Payer: COMMERCIAL

## 2024-03-19 DIAGNOSIS — Z00.00 LABORATORY EXAM ORDERED AS PART OF ROUTINE GENERAL MEDICAL EXAMINATION: ICD-10-CM

## 2024-03-19 DIAGNOSIS — D50.8 OTHER IRON DEFICIENCY ANEMIA: ICD-10-CM

## 2024-03-19 PROCEDURE — 83550 IRON BINDING TEST: CPT

## 2024-03-19 PROCEDURE — 83540 ASSAY OF IRON: CPT

## 2024-03-19 PROCEDURE — 36415 COLL VENOUS BLD VENIPUNCTURE: CPT

## 2024-03-19 PROCEDURE — 82728 ASSAY OF FERRITIN: CPT

## 2024-03-19 PROCEDURE — 84443 ASSAY THYROID STIM HORMONE: CPT

## 2024-03-20 LAB
DEPRECATED HBV CORE AB SER IA-ACNC: 6.7 NG/ML
IRON SATN MFR SERPL: 7 %
IRON SERPL-MCNC: 36 UG/DL
TIBC SERPL-MCNC: 514 UG/DL (ref 240–450)
TRANSFERRIN SERPL-MCNC: 345 MG/DL (ref 200–360)
TSI SER-ACNC: 0.92 MIU/ML (ref 0.36–3.74)

## 2024-03-27 ENCOUNTER — TELEMEDICINE (OUTPATIENT)
Dept: FAMILY MEDICINE CLINIC | Facility: CLINIC | Age: 49
End: 2024-03-27
Payer: COMMERCIAL

## 2024-03-27 DIAGNOSIS — N92.6 IRREGULAR MENSTRUAL BLEEDING: Primary | ICD-10-CM

## 2024-03-27 DIAGNOSIS — D50.9 IRON DEFICIENCY ANEMIA, UNSPECIFIED IRON DEFICIENCY ANEMIA TYPE: ICD-10-CM

## 2024-03-27 PROCEDURE — 99213 OFFICE O/P EST LOW 20 MIN: CPT | Performed by: FAMILY MEDICINE

## 2024-03-27 NOTE — PROGRESS NOTES
Video visit  Please note that the following visit was completed using two-way, real-time interactive audio and video communication.  This has been done in good evelyn to provide continuity of care in the best interest of the provider-patient relationship, due to the ongoing public health crisis/national emergency and because of restrictions of visitation.  There are limitations of this visit, as no physical exam could be performed.  Visit was planned and structured to allow sufficient time to address the issues presented.  Billing for this visit takes into account review of history, labs, medications, radiology tests , consultations and/or decision making.  Appropriate medical decision-making and tests are ordered as detailed in the assessment and plan of care.    HPI  48yr old female presents for f/u on recent labs. Had other labs done through employer in Jan 2024.   Anemia, has been having menstrual irregularities per pt. Last about 8d. Heavy for first couple days and then spotting the rest of the time. LMP 3/2. Hx of uterine fibroids. Has not been taking iron supplements per pt due to GI upset.    History/Other:   Review of Systems   Constitutional:  Positive for fatigue.   Gastrointestinal:  Negative for nausea and vomiting.   Genitourinary:  Positive for menstrual problem. Negative for pelvic pain.     Current Outpatient Medications   Medication Sig Dispense Refill    FERROUS GLUCONATE IRON OR  (Patient not taking: Reported on 3/27/2024)       Allergies:No Known Allergies    Objective:   Physical Exam  Vitals and nursing note reviewed.   Constitutional:       Appearance: Normal appearance.   HENT:      Head: Normocephalic and atraumatic.   Pulmonary:      Effort: Pulmonary effort is normal.   Neurological:      General: No focal deficit present.      Mental Status: She is alert and oriented to person, place, and time.   Psychiatric:         Mood and Affect: Mood normal.         Behavior: Behavior normal.          Assessment & Plan:   1. Irregular menstrual bleeding    2. Iron deficiency anemia, unspecified iron deficiency anemia type    - h/h (1/2024): 9.5/30.7, MCV 75, RDW 15.5  - ferritin (3/19/24): 6.7, iron 36, 7% sat  - advised to restart iron supplements, may try ferrous sulfate 325mg po daily and iron rich foods, would recommend taking 2 tabs daily during her menstrual cycle  - check pelvic US  - repeat cbc and ferritin in 1mo  - she understands and agrees with tx plan  - RTC after completing labs, sooner if needed    Orders Placed This Encounter   Procedures    CBC W Differential W Platelet [E]    Ferritin [E]       Meds This Visit:  Requested Prescriptions      No prescriptions requested or ordered in this encounter       Imaging & Referrals:  US PELVIS W EV (CPT=[id number])

## 2024-03-30 ENCOUNTER — PATIENT MESSAGE (OUTPATIENT)
Dept: FAMILY MEDICINE CLINIC | Facility: CLINIC | Age: 49
End: 2024-03-30

## 2024-04-01 NOTE — TELEPHONE ENCOUNTER
From: Faina Jones  To: Jean Pierre Bright  Sent: 3/30/2024 5:00 AM CDT  Subject: Iron supplements    Elicia Doctor,  Kindly advice , as per our last conversation I started this iron supplement 1 time a day,  Is this good?  Attached is the picture  Faina

## 2024-04-01 NOTE — TELEPHONE ENCOUNTER
Current supplement includes only 65 mg of elemental iron, based on last testing would you like patient to increase frequency daily?    Patient is not taking ferrous sulfate which was advised daily and during menstrual cycles to take two.     Sending to provider to comment as patient reports being told taking ferrous gluconate was ok.

## 2024-07-22 ENCOUNTER — HOSPITAL ENCOUNTER (EMERGENCY)
Age: 49
Discharge: HOME | End: 2024-07-22
Attending: EMERGENCY MEDICINE
Payer: COMMERCIAL

## 2024-07-22 ENCOUNTER — APPOINTMENT (OUTPATIENT)
Dept: CT IMAGING | Age: 49
End: 2024-07-22
Attending: EMERGENCY MEDICINE
Payer: COMMERCIAL

## 2024-07-22 VITALS
HEART RATE: 69 BPM | RESPIRATION RATE: 18 BRPM | DIASTOLIC BLOOD PRESSURE: 66 MMHG | OXYGEN SATURATION: 99 % | HEIGHT: 60 IN | WEIGHT: 165 LBS | BODY MASS INDEX: 32.39 KG/M2 | SYSTOLIC BLOOD PRESSURE: 120 MMHG | TEMPERATURE: 98 F

## 2024-07-22 DIAGNOSIS — N12 PYELONEPHRITIS: ICD-10-CM

## 2024-07-22 DIAGNOSIS — R10.9 ABDOMINAL PAIN OF UNKNOWN ETIOLOGY: Primary | ICD-10-CM

## 2024-07-22 LAB
ALBUMIN SERPL-MCNC: 3.4 G/DL (ref 3.4–5)
ALBUMIN/GLOB SERPL: 0.8 {RATIO} (ref 1–2)
ALP LIVER SERPL-CCNC: 99 U/L
ALT SERPL-CCNC: 22 U/L
ANION GAP SERPL CALC-SCNC: 5 MMOL/L (ref 0–18)
AST SERPL-CCNC: 12 U/L (ref 15–37)
B-HCG UR QL: NEGATIVE
BASOPHILS # BLD AUTO: 0.03 X10(3) UL (ref 0–0.2)
BASOPHILS NFR BLD AUTO: 0.5 %
BILIRUB SERPL-MCNC: 0.3 MG/DL (ref 0.1–2)
BILIRUB UR QL STRIP.AUTO: NEGATIVE
BUN BLD-MCNC: 11 MG/DL (ref 9–23)
CALCIUM BLD-MCNC: 9.4 MG/DL (ref 8.5–10.1)
CHLORIDE SERPL-SCNC: 104 MMOL/L (ref 98–112)
CLARITY UR REFRACT.AUTO: CLEAR
CO2 SERPL-SCNC: 26 MMOL/L (ref 21–32)
COLOR UR AUTO: YELLOW
CREAT BLD-MCNC: 0.82 MG/DL
EGFRCR SERPLBLD CKD-EPI 2021: 88 ML/MIN/1.73M2 (ref 60–?)
EOSINOPHIL # BLD AUTO: 0.17 X10(3) UL (ref 0–0.7)
EOSINOPHIL NFR BLD AUTO: 2.8 %
ERYTHROCYTE [DISTWIDTH] IN BLOOD BY AUTOMATED COUNT: 13.6 %
GLOBULIN PLAS-MCNC: 4.5 G/DL (ref 2.8–4.4)
GLUCOSE BLD-MCNC: 112 MG/DL (ref 70–99)
GLUCOSE UR STRIP.AUTO-MCNC: NEGATIVE MG/DL
HCT VFR BLD AUTO: 39.5 %
HGB BLD-MCNC: 13.2 G/DL
IMM GRANULOCYTES # BLD AUTO: 0.02 X10(3) UL (ref 0–1)
IMM GRANULOCYTES NFR BLD: 0.3 %
KETONES UR STRIP.AUTO-MCNC: NEGATIVE MG/DL
LEUKOCYTE ESTERASE UR QL STRIP.AUTO: NEGATIVE
LIPASE SERPL-CCNC: 47 U/L (ref 12–53)
LYMPHOCYTES # BLD AUTO: 1.56 X10(3) UL (ref 1–4)
LYMPHOCYTES NFR BLD AUTO: 25.6 %
MCH RBC QN AUTO: 28 PG (ref 26–34)
MCHC RBC AUTO-ENTMCNC: 33.4 G/DL (ref 31–37)
MCV RBC AUTO: 83.7 FL
MONOCYTES # BLD AUTO: 0.32 X10(3) UL (ref 0.1–1)
MONOCYTES NFR BLD AUTO: 5.3 %
NEUTROPHILS # BLD AUTO: 3.99 X10 (3) UL (ref 1.5–7.7)
NEUTROPHILS # BLD AUTO: 3.99 X10(3) UL (ref 1.5–7.7)
NEUTROPHILS NFR BLD AUTO: 65.5 %
NITRITE UR QL STRIP.AUTO: NEGATIVE
OSMOLALITY SERPL CALC.SUM OF ELEC: 280 MOSM/KG (ref 275–295)
PH UR STRIP.AUTO: 5.5 [PH] (ref 5–8)
PLATELET # BLD AUTO: 339 10(3)UL (ref 150–450)
POTASSIUM SERPL-SCNC: 3.4 MMOL/L (ref 3.5–5.1)
PROT SERPL-MCNC: 7.9 G/DL (ref 6.4–8.2)
PROT UR STRIP.AUTO-MCNC: NEGATIVE MG/DL
RBC # BLD AUTO: 4.72 X10(6)UL
SODIUM SERPL-SCNC: 135 MMOL/L (ref 136–145)
SP GR UR STRIP.AUTO: 1.02 (ref 1–1.03)
UROBILINOGEN UR STRIP.AUTO-MCNC: 0.2 MG/DL
WBC # BLD AUTO: 6.1 X10(3) UL (ref 4–11)

## 2024-07-22 PROCEDURE — 74176 CT ABD & PELVIS W/O CONTRAST: CPT | Performed by: EMERGENCY MEDICINE

## 2024-07-22 PROCEDURE — 81015 MICROSCOPIC EXAM OF URINE: CPT | Performed by: EMERGENCY MEDICINE

## 2024-07-22 PROCEDURE — 99285 EMERGENCY DEPT VISIT HI MDM: CPT

## 2024-07-22 PROCEDURE — 96375 TX/PRO/DX INJ NEW DRUG ADDON: CPT

## 2024-07-22 PROCEDURE — 80053 COMPREHEN METABOLIC PANEL: CPT | Performed by: EMERGENCY MEDICINE

## 2024-07-22 PROCEDURE — 81001 URINALYSIS AUTO W/SCOPE: CPT | Performed by: EMERGENCY MEDICINE

## 2024-07-22 PROCEDURE — 96376 TX/PRO/DX INJ SAME DRUG ADON: CPT

## 2024-07-22 PROCEDURE — 83690 ASSAY OF LIPASE: CPT | Performed by: EMERGENCY MEDICINE

## 2024-07-22 PROCEDURE — 81025 URINE PREGNANCY TEST: CPT

## 2024-07-22 PROCEDURE — 96365 THER/PROPH/DIAG IV INF INIT: CPT

## 2024-07-22 PROCEDURE — 85025 COMPLETE CBC W/AUTO DIFF WBC: CPT | Performed by: EMERGENCY MEDICINE

## 2024-07-22 PROCEDURE — 96361 HYDRATE IV INFUSION ADD-ON: CPT

## 2024-07-22 RX ORDER — CEPHALEXIN 500 MG/1
500 CAPSULE ORAL 4 TIMES DAILY
Qty: 28 CAPSULE | Refills: 0 | Status: SHIPPED | OUTPATIENT
Start: 2024-07-22 | End: 2024-07-29

## 2024-07-22 RX ORDER — ONDANSETRON 2 MG/ML
4 INJECTION INTRAMUSCULAR; INTRAVENOUS ONCE
Status: DISCONTINUED | OUTPATIENT
Start: 2024-07-22 | End: 2024-07-22

## 2024-07-22 RX ORDER — KETOROLAC TROMETHAMINE 30 MG/ML
30 INJECTION, SOLUTION INTRAMUSCULAR; INTRAVENOUS ONCE
Status: COMPLETED | OUTPATIENT
Start: 2024-07-22 | End: 2024-07-22

## 2024-07-22 RX ORDER — ACETAMINOPHEN 500 MG
1000 TABLET ORAL ONCE
Status: COMPLETED | OUTPATIENT
Start: 2024-07-22 | End: 2024-07-22

## 2024-07-22 RX ORDER — NITROFURANTOIN 25; 75 MG/1; MG/1
100 CAPSULE ORAL 2 TIMES DAILY
COMMUNITY
End: 2024-07-24

## 2024-07-22 RX ORDER — HYDROMORPHONE HYDROCHLORIDE 1 MG/ML
INJECTION, SOLUTION INTRAMUSCULAR; INTRAVENOUS; SUBCUTANEOUS
Status: COMPLETED
Start: 2024-07-22 | End: 2024-07-22

## 2024-07-22 RX ORDER — ACETAMINOPHEN 500 MG
TABLET ORAL
Status: COMPLETED
Start: 2024-07-22 | End: 2024-07-22

## 2024-07-22 RX ORDER — TRAMADOL HYDROCHLORIDE 50 MG/1
TABLET ORAL EVERY 6 HOURS PRN
Qty: 10 TABLET | Refills: 0 | Status: SHIPPED | OUTPATIENT
Start: 2024-07-22 | End: 2024-07-27

## 2024-07-22 RX ORDER — HYDROMORPHONE HYDROCHLORIDE 1 MG/ML
0.5 INJECTION, SOLUTION INTRAMUSCULAR; INTRAVENOUS; SUBCUTANEOUS ONCE
Status: COMPLETED | OUTPATIENT
Start: 2024-07-22 | End: 2024-07-22

## 2024-07-22 RX ORDER — POTASSIUM CHLORIDE 20 MEQ/1
20 TABLET, EXTENDED RELEASE ORAL ONCE
Status: COMPLETED | OUTPATIENT
Start: 2024-07-22 | End: 2024-07-22

## 2024-07-22 NOTE — ED PROVIDER NOTES
Patient Seen in: [facility] [place]      History     Chief Complaint   Patient presents with    Urinary Symptoms     Stated Complaint: urinary complaints- recent UTI    Subjective:   HPI    This is a 49-year-old female who presents with urinary  complaints.  Symptoms started last Wednesday she went to urgent care and placed on Macrobid now with pain in the right flank urinary frequency has gotten better but she is still having right flank pain, rating to her right side of her abdomen patient has had no fever but does state that the dysuria is gotten better no diarrhea no chest pain shortness of breath.  Pain in the right flank.  History of a  previously, D&C,    Objective:   Past Medical History:    Iron deficiency anemia    Obesity              Past Surgical History:   Procedure Laterality Date      ,,    D & c  2010    Hc  section level i      Rotator cuff repair Left                 Social History     Socioeconomic History    Marital status:    Tobacco Use    Smoking status: Never     Passive exposure: Never    Smokeless tobacco: Never   Vaping Use    Vaping status: Never Used   Substance and Sexual Activity    Alcohol use: Never    Drug use: Never    Sexual activity: Yes     Partners: Male     Birth control/protection: Condom   Other Topics Concern    Caffeine Concern No    Exercise No    Seat Belt No    Special Diet No    Stress Concern No    Weight Concern No              Review of Systems    Positive for stated Chief Complaint: Urinary Symptoms    Other systems are as noted in HPI.  Constitutional and vital signs reviewed.      All other systems reviewed and negative except as noted above.    Physical Exam     ED Triage Vitals [24 0912]   /72   Pulse 97   Resp 20   Temp 98 °F (36.7 °C)   Temp src Oral   SpO2 100 %   O2 Device None (Room air)       Current Vitals:   Vital Signs  BP: 120/66  Pulse: 69  Resp: 18  Temp: 98 °F (36.7  °C)  Temp src: Oral    Oxygen Therapy  SpO2: 99 %  O2 Device: None (Room air)            Physical Exam  General: .  Patient is a young female no respiratory distress.  The patient is in no respiratory distress    HEENT: Atraumatic, conjunctiva are not pale.  There is no icterus.  Oral mucosa Is wet.  No facial trauma.  The neck is supple.    LUNGS: Clear to auscultation, there is no wheezing or retraction.  No crackles.    CV: Cardiovascular is regular without murmurs or rubs.    ABD: The abdomen is soft nondistended minimally tender on the right lateral side of the abdomen, right flank area is tender.  There is no redness or cellulitis no lower abdominal tenderness.  There is no rebound.  There is no guarding.    EXT: There is good pulses bilaterally.  There is no calf tenderness.  There is no rash noted.  There is no edema    NEURO: Alert and oriented x4.  Muscle strength and sensory exam is grossly normal.  And the patient is neurologically intact with no focal findings.         ED Course     Labs Reviewed   COMP METABOLIC PANEL (14) - Abnormal; Notable for the following components:       Result Value    Glucose 112 (*)     Sodium 135 (*)     Potassium 3.4 (*)     AST 12 (*)     Globulin  4.5 (*)     A/G Ratio 0.8 (*)     All other components within normal limits   URINALYSIS WITH CULTURE REFLEX - Abnormal; Notable for the following components:    Blood Urine Trace-lysed (*)     All other components within normal limits   UA MICROSCOPIC ONLY, URINE - Abnormal; Notable for the following components:    Bacteria Urine Rare (*)     Squamous Epi. Cells Few (*)     All other components within normal limits   LIPASE - Normal   POCT PREGNANCY URINE - Normal   CBC WITH DIFFERENTIAL WITH PLATELET    Narrative:     The following orders were created for panel order CBC With Differential With Platelet.  Procedure                               Abnormality         Status                     ---------                                -----------         ------                     CBC W/ DIFFERENTIAL[[id number]]                              Final result                 Please view results for these tests on the individual orders.   CBC W/ DIFFERENTIAL           the patient was placed on monitors, IV was started, blood was drawn.  Workup was done to rule out obstruction, perforation, kidney stones, UTI         MDM              Patient's pregnancy test is negative lipase is normal comprehensive shows a slightly low potassium 3.4 she was given oral potassium here urinalysis did show rare bacteria.  It did show 1-5 white cells.      Limited ultrasound did not show any obvious gallstones although gallbladder was somewhat contracted.       I personally reviewed the radiographs and my individual interpretation shows    No obvious obstruction, perforation, no kidney stones.    Also reviewed official report and it shows    Impression  CONCLUSION:    1. No obstructing renal or ureteral calcification appreciated.  2. Bladder wall thickening with slight infiltration of the adjacent fat concerning for cystitis, correlate clinically.           I did go back and reexamined the patient she has right flank tenderness.  Her right upper abdominal pain is actually pretty much resolved at this present time.  She has no lower abdominal tenderness.  The patient's original symptoms started with dysuria urinary frequency I do feel that most likely this is consistent with a UTI that might of been partially treated there is no obvious kidney stones or obvious abscess.  No obvious gallstones on the CT and her pain is seems more flank and abdomen on repeat exam.  Discussed that there could be always a possibility that she could I could miss this most gallstone not seen on her workup here.  But I discussed that it would be reasonable to try a to treat as a potential pyelonephritis.  The patient feels comfortable with this.  I discussed if he has any high fevers increasing  pain or discomfort to return here.  I recommend close follow-up with her primary care physician I discussed about the incidental ovarian cyst she has no lower abdominal pain on exam.  But it should be followed with her own primary care physician may be needed outpatient ultrasound.  The patient CBC was also within normal limits.    Repeat exam shows minimal tenderness in the right flank area but no other abdominal tenderness no rebound, guarding..  She was given IV antibiotics.  And IV fluids.    I discussed with the patient that were seeing them  in a short period of time.  I discussed with them that there is always a possibility that things can change and a need reevaluation with their primary care physician as soon as possible.  I've also discussed with them that if the pain gets worse to return to the emergency room immediately.                This note was prepared using Dragon Medical voice recognition dictation software. As a result errors may occur. When identified these errors have been corrected. While every attempt is made to correct errors during dictation discrepancies may still exist.  If there is any questions contact the dictating provider for further clarification            Disposition and Plan     Clinical Impression:  1. Abdominal pain of unknown etiology    2. Pyelonephritis         Disposition:  Discharge  7/22/2024 12:38 pm    Follow-up:  Eugenio Roberts DO  [address]  [facility] [address]  [facility] [address]  [phone]    Follow up in 2 day(s)            Medications Prescribed:  Current Discharge Medication List        START taking these medications    Details   traMADol 50 MG Oral Tab Take 1-2 tablets ( mg total) by mouth every 6 (six) hours as needed for Pain.  Qty: 10 tablet, Refills: 0    Associated Diagnoses: Pyelonephritis      cephalexin 500 MG Oral Cap Take 1 capsule (500 mg total) by mouth 4 (four) times daily for 7 days.  Qty: 28 capsule, Refills: 0

## 2024-07-22 NOTE — DISCHARGE INSTRUCTIONS
Take Motrin, Tylenol but if continued symptoms take tramadol return if increasing pain discomfort fevers or chills    You were seen in the emergency room in a limited time.  There is a possibility that although we do not see any acute process at this present time that things can change with time.  Is therefore imperative that you follow-up with primary care physician for close follow-up.  If there is any significant progression of your pain  or other symptoms you to return immediately to the emergency room.

## 2024-07-22 NOTE — ED INITIAL ASSESSMENT (HPI)
Uri sx [facility] and [facility] on microbid.  Now with pain to right flank but urinary frequency has gotten better.

## 2024-07-24 ENCOUNTER — OFFICE VISIT (OUTPATIENT)
Dept: FAMILY MEDICINE CLINIC | Facility: CLINIC | Age: 49
End: 2024-07-24
Payer: COMMERCIAL

## 2024-07-24 VITALS
WEIGHT: 178.13 LBS | DIASTOLIC BLOOD PRESSURE: 80 MMHG | BODY MASS INDEX: 34.07 KG/M2 | OXYGEN SATURATION: 99 % | HEIGHT: 60.63 IN | SYSTOLIC BLOOD PRESSURE: 122 MMHG | HEART RATE: 83 BPM | TEMPERATURE: 98 F | RESPIRATION RATE: 16 BRPM

## 2024-07-24 DIAGNOSIS — B96.20 E-COLI UTI: Primary | ICD-10-CM

## 2024-07-24 DIAGNOSIS — R07.81 RIB PAIN ON RIGHT SIDE: ICD-10-CM

## 2024-07-24 DIAGNOSIS — N39.0 E-COLI UTI: Primary | ICD-10-CM

## 2024-07-24 PROCEDURE — 99213 OFFICE O/P EST LOW 20 MIN: CPT | Performed by: FAMILY MEDICINE

## 2024-07-24 NOTE — PROGRESS NOTES
Faina Jones is a 49 year old female.  HPI:   Patient presents for f/u after recent UTI and R sided pain. States symptoms of UTI began after she went cramping on 7/17 with dysuria. She started drinking more water and felt her symptoms on and off for the next couple days. However, symptoms worsened with dysuria, suprapubic pressure, urinary urgency and frequency. She went to the  at [facility] and was diagnosed with UTI and started on macrobid. She began having flank pain after starting the abx, so she went to [place] ED. She was given Rocephin in the ED and discharged home on Keflex. Urinary symptoms have improved but still having R sided pain. Has been taking tylenol and advil, which is helping temporarily. Pain on R side worse in the mornings and in certain positions. Denies any fevers/chills.         Current Outpatient Medications   Medication Sig Dispense Refill    cephalexin 500 MG Oral Cap Take 1 capsule (500 mg total) by mouth 4 (four) times daily for 7 days. 28 capsule 0    FERROUS GLUCONATE IRON OR  (Patient not taking: Reported on 3/27/2024)        Past Medical History:    Iron deficiency anemia    Obesity      Social History:  Social History     Socioeconomic History    Marital status:    Tobacco Use    Smoking status: Never     Passive exposure: Never    Smokeless tobacco: Never   Vaping Use    Vaping status: Never Used   Substance and Sexual Activity    Alcohol use: Never    Drug use: Never    Sexual activity: Yes     Partners: Male     Birth control/protection: Condom   Other Topics Concern    Caffeine Concern No    Exercise No    Seat Belt No    Special Diet No    Stress Concern No    Weight Concern No         REVIEW OF SYSTEMS:   GENERAL HEALTH: feels well otherwise  SKIN: denies any unusual skin lesions or rashes  RESPIRATORY: no shortness of breath with exertion  CARDIOVASCULAR: denies chest pain on exertion  GI: no nausea or vomiting  NEURO: denies headaches    EXAM:   /80   Pulse 83    Temp 97.6 °F (36.4 °C) (Temporal)   Resp 16   Ht 5' 0.63\" (1.54 m)   Wt 178 lb 2 oz (80.8 kg)   LMP 07/24/2024 (Exact Date)   SpO2 99%   BMI 34.07 kg/m²   GENERAL: well developed, well nourished,in no apparent distress  SKIN: no rashes,no suspicious lesions  CV: RRR, no m/r/g  LUNG: CTAB, no w/r/r  GI: good BS's,no masses, HSM; suprapubic tenderness, no CVAT  MS: +tenderness of R lower rib cage, no step offs or crepitus noted    ASSESSMENT AND PLAN:   49yr old female presents with:  1. E-coli UTI  - advised to finish course of keflex after reviewed UCx and JOSEFA  - cont increased water intake, cranberry juice/capsules and voiding after intercourse  - monitor symptoms    2. Rib pain on right side  - clinically consistent with musculoskeletal etiology  - advised symptomatic tx: ice/heat, nsaids and HEP  - avoid heavy lifting/pushing/pulling and stress  - monitor symptoms    The patient indicates understanding of these issues and agrees to the plan.  The patient is asked to return in 3 days if not better. Call if fever, vomiting, worsening symptoms.

## 2024-08-14 ENCOUNTER — PATIENT MESSAGE (OUTPATIENT)
Facility: CLINIC | Age: 49
End: 2024-08-14

## 2024-08-14 NOTE — TELEPHONE ENCOUNTER
From: Faina Jones  To: Diana Dean  Sent: 8/14/2024 9:14 AM CDT  Subject: Bleeding or spotting since 4 weeks    Hi Doc.  Hopefully you are doing great.  As per your advice, after stopping OCP last Nov, my bleeding was regular sometime light some time heavy but normal schedule.  Unfortunately since July 13 2024 I have this on off spotting. some days nothing, some days just brown, some days spotting and some days like day 1 of period.  I had bad UTI last month that was treated in Emergency..  Now no UTI but back pain and this spotting/ bleeding/ dry days going on.  Pls advice should I restart OCP.  Thanks  Faina

## 2024-08-20 ENCOUNTER — PATIENT MESSAGE (OUTPATIENT)
Facility: CLINIC | Age: 49
End: 2024-08-20

## 2024-08-20 ENCOUNTER — OFFICE VISIT (OUTPATIENT)
Facility: CLINIC | Age: 49
End: 2024-08-20
Payer: COMMERCIAL

## 2024-08-20 VITALS
BODY MASS INDEX: 33.85 KG/M2 | HEART RATE: 94 BPM | DIASTOLIC BLOOD PRESSURE: 62 MMHG | HEIGHT: 60.63 IN | SYSTOLIC BLOOD PRESSURE: 124 MMHG | WEIGHT: 177 LBS

## 2024-08-20 DIAGNOSIS — Z12.31 ENCOUNTER FOR SCREENING MAMMOGRAM FOR BREAST CANCER: Primary | ICD-10-CM

## 2024-08-20 DIAGNOSIS — Z12.4 PAPANICOLAOU SMEAR FOR CERVICAL CANCER SCREENING: ICD-10-CM

## 2024-08-20 DIAGNOSIS — Z01.812 PRE-PROCEDURAL LABORATORY EXAMINATION: ICD-10-CM

## 2024-08-20 LAB
CONTROL LINE PRESENT WITH A CLEAR BACKGROUND (YES/NO): YES YES/NO
KIT LOT #: NORMAL NUMERIC
PREGNANCY TEST, URINE: NEGATIVE

## 2024-08-20 PROCEDURE — 81025 URINE PREGNANCY TEST: CPT | Performed by: OBSTETRICS & GYNECOLOGY

## 2024-08-20 PROCEDURE — 88175 CYTOPATH C/V AUTO FLUID REDO: CPT | Performed by: OBSTETRICS & GYNECOLOGY

## 2024-08-20 PROCEDURE — 99213 OFFICE O/P EST LOW 20 MIN: CPT | Performed by: OBSTETRICS & GYNECOLOGY

## 2024-08-20 RX ORDER — DROSPIRENONE 4 MG/1
4 TABLET, FILM COATED ORAL DAILY
Qty: 28 TABLET | Refills: 12 | Status: SHIPPED | OUTPATIENT
Start: 2024-08-20 | End: 2025-08-20

## 2024-08-20 RX ORDER — MEDROXYPROGESTERONE ACETATE 10 MG
10 TABLET ORAL DAILY
Qty: 10 TABLET | Refills: 0 | Status: SHIPPED | OUTPATIENT
Start: 2024-08-20 | End: 2024-08-30

## 2024-08-20 NOTE — PROGRESS NOTES
Faina Jones is a 49 year old female  Patient's last menstrual period was 2024 (exact date).   Chief Complaint   Patient presents with    Gyn Problem     Pt seen PCP on 2024 for AUB lab and ultrasound order was placed only lab work was done.   - Pt states her ultrasound is not til Sept. 1  - Changes pad every 6 hours    .     Last year she was seen with irregular bleeding the Pap and HPV were negative.  She had endometrial biopsy that was benign.  She was placed on a birth control pill gave her headache was then placed on progesterone only pill and had regular periods until November.  She bled all month she stopped the progesterone only pill and had regular periods until recently and now has been bleeding on and off since then.  She has to wear a pad.  Minimal pain.  Occasionally the bleeding will stop for 2 days.  Her TSH was normal she had a CAT scan that was normal in the emergency room    OBSTETRICS HISTORY:  OB History    Para Term  AB Living   4 3 3   1 3   SAB IAB Ectopic Multiple Live Births   1       3      # Outcome Date GA Lbr Daniel/2nd Weight Sex Type Anes PTL Lv   4 Term 12 40w0d  5 lb 15.2 oz (2.7 kg) F Caesarean   GALDINO   3 SAB         FD   2 Term 06 38w0d  6 lb 9.1 oz (2.98 kg) M Caesarean   GALDINO   1 Term 03 40w0d  6 lb 6.3 oz (2.9 kg) M Caesarean   GALDION       GYNE HISTORY:  Periods irregular light    History   Sexual Activity    Sexual activity: Yes    Partners: Male    Birth control/ protection: Condom        Pap Date: 23        MEDICAL HISTORY:  Past Medical History:    Iron deficiency anemia    Obesity       SURGICAL HISTORY:  Past Surgical History:   Procedure Laterality Date      ,,    D & c      Hc  section level i      Rotator cuff repair Left        SOCIAL HISTORY:  Social History     Socioeconomic History    Marital status:      Spouse name: Not on file    Number of children: Not on file    Years  of education: Not on file    Highest education level: Not on file   Occupational History    Not on file   Tobacco Use    Smoking status: Never     Passive exposure: Never    Smokeless tobacco: Never   Vaping Use    Vaping status: Never Used   Substance and Sexual Activity    Alcohol use: Never    Drug use: Never    Sexual activity: Yes     Partners: Male     Birth control/protection: Condom   Other Topics Concern    Caffeine Concern No    Exercise No    Seat Belt No    Special Diet No    Stress Concern No    Weight Concern No   Social History Narrative    Not on file     Social Determinants of Health     Financial Resource Strain: Not on file   Food Insecurity: Not on file   Transportation Needs: Not on file   Physical Activity: Not on file   Stress: Not on file   Social Connections: Not on file   Housing Stability: Not on file       FAMILY HISTORY:  Family History   Problem Relation Age of Onset    Hypertension Father     Diabetes Father     Lipids Father     Hypertension Mother     Cancer Maternal Grandmother        MEDICATIONS:    Current Outpatient Medications:     medroxyPROGESTERone Acetate (PROVERA) 10 MG Oral Tab, Take 1 tablet (10 mg total) by mouth daily for 10 days., Disp: 10 tablet, Rfl: 0    Drospirenone (SLYND) 4 MG Oral Tab, Take 4 mg by mouth daily., Disp: 28 tablet, Rfl: 12    FERROUS GLUCONATE IRON OR, , Disp: , Rfl:     ALLERGIES:  No Known Allergies      Review of Systems:  Constitutional:  Denies fatigue, night sweats, hot flashes    Neurological:  denies headaches, extremity weakness or numbness.      PHYSICAL EXAM:   Constitutional: well developed, well nourished  Head/Face: normocephalic      Skin/Hair: no unusual rashes or bruises   Extremities: no edema, no cyanosis  Psychiatric:  Oriented to time, place, person and situation. Appropriate mood and affect    Pelvic Exam:  External Genitalia: normal appearance, hair distribution, and no lesions  Urethral Meatus:  normal in size, location,  without lesions and prolapse  Bladder:  No fullness, masses or tenderness  Vagina:  Normal appearance without lesions, no abnormal discharge  Cervix:  Normal without tenderness on motion without lesions Pap.  Moderate dark blood  Uterus: normal in size, contour, position, mobility, without tenderness  Adnexa: normal without masses or tenderness  Perineum: normal  Anus: no hemorroids     Assessment & Plan:  Faina was seen today for gyn problem.    Diagnoses and all orders for this visit:    Encounter for screening mammogram for breast cancer  -     [facility] NISA 2D+3D SCREENING BILAT (CPT=77067/47535); Future    Pre-procedural laboratory examination  -     Urine Preg Test [80144]    Papanicolaou smear for cervical cancer screening  -     ThinPrep PAP with HPV Reflex Request B; Future    Other orders  -     medroxyPROGESTERone Acetate (PROVERA) 10 MG Oral Tab; Take 1 tablet (10 mg total) by mouth daily for 10 days.  -     Drospirenone (SLYND) 4 MG Oral Tab; Take 4 mg by mouth daily.        Provera 10 mg for 10 days then Slynd.

## 2024-08-27 ENCOUNTER — TELEPHONE (OUTPATIENT)
Facility: CLINIC | Age: 49
End: 2024-08-27

## 2024-08-27 DIAGNOSIS — N93.9 ABNORMAL UTERINE BLEEDING (AUB): Primary | ICD-10-CM

## 2024-08-27 RX ORDER — TRANEXAMIC ACID 650 MG/1
1300 TABLET ORAL 3 TIMES DAILY
Qty: 15 TABLET | Refills: 0 | Status: SHIPPED | OUTPATIENT
Start: 2024-08-27 | End: 2024-09-01

## 2024-08-27 RX ORDER — TRANEXAMIC ACID 650 MG/1
1300 TABLET ORAL 3 TIMES DAILY
Qty: 15 TABLET | Refills: 0 | Status: SHIPPED | OUTPATIENT
Start: 2024-08-27 | End: 2024-08-28

## 2024-08-27 NOTE — TELEPHONE ENCOUNTER
Having bleeding with clots. As soon as she stands up has gush of blood. Last night I took my 6th Provera 10mg    Onset last night  Irregular bleeding since July 8/20/24 seen in office. Pregnancy test negative.   US scheduled 9/6/24    Feels light headed and weak.   Discussed with , chart reviewed, recommends Lysteda 1300 TID x5d then start Slynd order pended.     Work excuse letter request for today and tomm. Ok with , [facility]     If you are experiencing heavy bleeding: bleeding that soaks through 1 maxi pad an hour and is not slowing down, any blood clots bigger than a golf ball, feeling faint, dizzy, chills, sweaty, nauseous with bleeding, go to ER.   Patient verbalized understanding, agreed to and intend to comply with plan of care.

## 2024-08-28 LAB
.: NORMAL
.: NORMAL

## 2024-08-28 RX ORDER — TRANEXAMIC ACID 650 MG/1
1300 TABLET ORAL 3 TIMES DAILY
Qty: 30 TABLET | Refills: 0 | Status: CANCELLED | OUTPATIENT
Start: 2024-08-28 | End: 2024-09-02

## 2024-08-28 NOTE — TELEPHONE ENCOUNTER
Received fax from [employer] to clarify quantity of RX Tranexamic acid:  [facility] Verified as 2 tablets TID X 5 DAYS = 30 TABLETS.

## 2024-08-28 NOTE — TELEPHONE ENCOUNTER
On call OB/GYN    49 year old  female c/o Prescription problem - says Rx Tranexamic acid was for 15 tablets & was supposed to be 30 tablets.     Rx per Dr. Dean was sent at around 5 pm this evening to pharmacy. They gave her 15 tablets & asked her to call the clinic in the morning to ask for the additional 15 tablets to be prescribed. She decided to call the on call physician to fill these tablets though she has enough tablets already at home to last her 2+ days.     Her pharmacy is not open currently.   New Rx sent     Disp Refills Start End    tranexamic acid 650 MG Oral Tab 15 tablet 0 2024 --    Sig - Route: Take 2 tablets (1,300 mg total) by mouth in the morning, at noon, and at bedtime. - Oral    Sent to pharmacy as: Tranexamic Acid 650 MG Oral Tablet (Lysteda)    Notes to Pharmacy: This is to complete the total of 30 tablets - the patient was previously prescribed 15 tablets by mistake by Dr. Dean. It should have been 30 tablets originally.    E-Prescribing Status: Receipt confirmed by pharmacy (2024  9:41 PM CDT)    Renewals    Renewal requests to authorizing provider (Porshce Mancuso MD) prohibited   Renewal provider: Diana Dean MD Megan Mietelski, MD

## 2024-08-28 NOTE — TELEPHONE ENCOUNTER
Spoke with pharmacist. Clarified quantity to be 30 tabs. Patient has already picked up 15 tabs so the pharmacy will distribute the remaining 15 tabs. Attempted to call the patient but there was no answer or voice mail.

## 2024-09-01 ENCOUNTER — HOSPITAL ENCOUNTER (OUTPATIENT)
Dept: ULTRASOUND IMAGING | Age: 49
End: 2024-09-01
Attending: FAMILY MEDICINE
Payer: COMMERCIAL

## 2024-09-01 ENCOUNTER — HOSPITAL ENCOUNTER (OUTPATIENT)
Dept: ULTRASOUND IMAGING | Age: 49
Discharge: HOME | End: 2024-09-01
Attending: FAMILY MEDICINE
Payer: COMMERCIAL

## 2024-09-01 DIAGNOSIS — N92.6 IRREGULAR MENSTRUAL BLEEDING: ICD-10-CM

## 2024-09-01 DIAGNOSIS — D50.9 IRON DEFICIENCY ANEMIA, UNSPECIFIED IRON DEFICIENCY ANEMIA TYPE: ICD-10-CM

## 2024-09-01 PROCEDURE — 76830 TRANSVAGINAL US NON-OB: CPT | Performed by: FAMILY MEDICINE

## 2024-09-01 PROCEDURE — 76856 US EXAM PELVIC COMPLETE: CPT | Performed by: FAMILY MEDICINE

## 2024-10-01 ENCOUNTER — OFFICE VISIT (OUTPATIENT)
Facility: CLINIC | Age: 49
End: 2024-10-01
Payer: COMMERCIAL

## 2024-10-01 VITALS
WEIGHT: 177.38 LBS | BODY MASS INDEX: 33.93 KG/M2 | HEIGHT: 60.63 IN | HEART RATE: 87 BPM | SYSTOLIC BLOOD PRESSURE: 118 MMHG | DIASTOLIC BLOOD PRESSURE: 70 MMHG

## 2024-10-01 DIAGNOSIS — Z23 NEED FOR VACCINATION: ICD-10-CM

## 2024-10-01 DIAGNOSIS — Z01.419 WELL WOMAN EXAM WITH ROUTINE GYNECOLOGICAL EXAM: Primary | ICD-10-CM

## 2024-10-01 PROCEDURE — 99212 OFFICE O/P EST SF 10 MIN: CPT | Performed by: OBSTETRICS & GYNECOLOGY

## 2024-10-01 PROCEDURE — 90656 IIV3 VACC NO PRSV 0.5 ML IM: CPT | Performed by: OBSTETRICS & GYNECOLOGY

## 2024-10-01 PROCEDURE — 90471 IMMUNIZATION ADMIN: CPT | Performed by: OBSTETRICS & GYNECOLOGY

## 2024-10-01 NOTE — PROGRESS NOTES
When she took the lysteda her bleeding decreased quite a bit.  Then she immediately started the Slynd she is on the second pack first day.  Her bleeding is much improved and lighter.  Fibroids were discussed.  She is complaining about some muscle pain in the morning when she was in the emergency room her potassium was a little bit low.  She is due for blood work so we will recheck her potassium.  Flu shot will be done.

## 2024-10-21 ENCOUNTER — NURSE TRIAGE (OUTPATIENT)
Dept: FAMILY MEDICINE CLINIC | Facility: CLINIC | Age: 49
End: 2024-10-21

## 2024-10-21 NOTE — TELEPHONE ENCOUNTER
Action Requested: Summary for Provider     []  Critical Lab, Recommendations Needed  [] Need Additional Advice  [x]   FYI    []   Need Orders  [] Need Medications Sent to Pharmacy  []  Other     SUMMARY: Patient called reporting severe back pain. Patient reports constant 7-10/10 pain she believes is due to a muscle strain.  Patient denies any injury, fever, or UTI symptoms. Patient was seen in ED on 7/22/24 for abdominal pain and followed up with Dr. Bright on 7/24/24. Patient stating she is unable to take Advil due to a medication interaction with her birth control. Patient reports no relief from Extra strength tylenol 500 mg. Patient sounds like she is in severe pain over the phone and was directed to go into an Immediate Care for exam. Patient agreed with the plan and stated she is going now.    Reason for call: Back Pain (severe)  Onset: 2-3 days ago      OB/GYN visit: 10/1/24  LOV: 7/24/24  ED: 7/22/24      Reason for Disposition   SEVERE back pain (e.g., excruciating, unable to do any normal activities) and not improved after pain medicine and CARE ADVICE    Protocols used: Back Pain-A-[place]

## 2024-10-22 ENCOUNTER — HOSPITAL ENCOUNTER (EMERGENCY)
Age: 49
Discharge: HOME | End: 2024-10-22
Attending: EMERGENCY MEDICINE | Admitting: OBSTETRICS & GYNECOLOGY
Payer: COMMERCIAL

## 2024-10-22 ENCOUNTER — APPOINTMENT (OUTPATIENT)
Dept: GENERAL RADIOLOGY | Age: 49
End: 2024-10-22
Attending: PHYSICIAN ASSISTANT
Payer: COMMERCIAL

## 2024-10-22 VITALS
OXYGEN SATURATION: 100 % | SYSTOLIC BLOOD PRESSURE: 128 MMHG | RESPIRATION RATE: 16 BRPM | WEIGHT: 178 LBS | HEART RATE: 99 BPM | BODY MASS INDEX: 34.95 KG/M2 | TEMPERATURE: 98 F | DIASTOLIC BLOOD PRESSURE: 99 MMHG | HEIGHT: 60 IN

## 2024-10-22 DIAGNOSIS — M54.50 ACUTE RIGHT-SIDED LOW BACK PAIN WITHOUT SCIATICA: Primary | ICD-10-CM

## 2024-10-22 LAB
ALBUMIN SERPL-MCNC: 3.4 G/DL (ref 3.4–5)
ALBUMIN/GLOB SERPL: 0.8 {RATIO} (ref 1–2)
ALP LIVER SERPL-CCNC: 88 U/L
ALT SERPL-CCNC: 21 U/L
ANION GAP SERPL CALC-SCNC: 6 MMOL/L (ref 0–18)
AST SERPL-CCNC: 12 U/L (ref 15–37)
B-HCG UR QL: NEGATIVE
BASOPHILS # BLD AUTO: 0.03 X10(3) UL (ref 0–0.2)
BASOPHILS NFR BLD AUTO: 0.5 %
BILIRUB SERPL-MCNC: 0.2 MG/DL (ref 0.1–2)
BILIRUB UR QL STRIP.AUTO: NEGATIVE
BUN BLD-MCNC: 12 MG/DL (ref 9–23)
CALCIUM BLD-MCNC: 9 MG/DL (ref 8.5–10.1)
CHLORIDE SERPL-SCNC: 106 MMOL/L (ref 98–112)
CLARITY UR REFRACT.AUTO: CLEAR
CO2 SERPL-SCNC: 25 MMOL/L (ref 21–32)
COLOR UR AUTO: YELLOW
CREAT BLD-MCNC: 0.92 MG/DL
EGFRCR SERPLBLD CKD-EPI 2021: 77 ML/MIN/1.73M2 (ref 60–?)
EOSINOPHIL # BLD AUTO: 0.11 X10(3) UL (ref 0–0.7)
EOSINOPHIL NFR BLD AUTO: 1.9 %
ERYTHROCYTE [DISTWIDTH] IN BLOOD BY AUTOMATED COUNT: 14.1 %
GLOBULIN PLAS-MCNC: 4.3 G/DL (ref 2.8–4.4)
GLUCOSE BLD-MCNC: 123 MG/DL (ref 70–99)
GLUCOSE UR STRIP.AUTO-MCNC: NEGATIVE MG/DL
HCT VFR BLD AUTO: 37.3 %
HGB BLD-MCNC: 12.4 G/DL
IMM GRANULOCYTES # BLD AUTO: 0.01 X10(3) UL (ref 0–1)
IMM GRANULOCYTES NFR BLD: 0.2 %
KETONES UR STRIP.AUTO-MCNC: NEGATIVE MG/DL
LEUKOCYTE ESTERASE UR QL STRIP.AUTO: NEGATIVE
LYMPHOCYTES # BLD AUTO: 1.74 X10(3) UL (ref 1–4)
LYMPHOCYTES NFR BLD AUTO: 30.7 %
MCH RBC QN AUTO: 28 PG (ref 26–34)
MCHC RBC AUTO-ENTMCNC: 33.2 G/DL (ref 31–37)
MCV RBC AUTO: 84.2 FL
MONOCYTES # BLD AUTO: 0.32 X10(3) UL (ref 0.1–1)
MONOCYTES NFR BLD AUTO: 5.6 %
NEUTROPHILS # BLD AUTO: 3.46 X10 (3) UL (ref 1.5–7.7)
NEUTROPHILS # BLD AUTO: 3.46 X10(3) UL (ref 1.5–7.7)
NEUTROPHILS NFR BLD AUTO: 61.1 %
NITRITE UR QL STRIP.AUTO: NEGATIVE
OSMOLALITY SERPL CALC.SUM OF ELEC: 285 MOSM/KG (ref 275–295)
PH UR STRIP.AUTO: 5.5 [PH] (ref 5–8)
PLATELET # BLD AUTO: 356 10(3)UL (ref 150–450)
POTASSIUM SERPL-SCNC: 3.4 MMOL/L (ref 3.5–5.1)
PROT SERPL-MCNC: 7.7 G/DL (ref 6.4–8.2)
RBC # BLD AUTO: 4.43 X10(6)UL
RBC #/AREA URNS AUTO: >10 /HPF
SODIUM SERPL-SCNC: 137 MMOL/L (ref 136–145)
SP GR UR STRIP.AUTO: >=1.03 (ref 1–1.03)
UROBILINOGEN UR STRIP.AUTO-MCNC: 0.2 MG/DL
WBC # BLD AUTO: 5.7 X10(3) UL (ref 4–11)

## 2024-10-22 PROCEDURE — 72100 X-RAY EXAM L-S SPINE 2/3 VWS: CPT | Performed by: PHYSICIAN ASSISTANT

## 2024-10-22 PROCEDURE — 85025 COMPLETE CBC W/AUTO DIFF WBC: CPT

## 2024-10-22 PROCEDURE — 99284 EMERGENCY DEPT VISIT MOD MDM: CPT

## 2024-10-22 PROCEDURE — 85025 COMPLETE CBC W/AUTO DIFF WBC: CPT | Performed by: EMERGENCY MEDICINE

## 2024-10-22 PROCEDURE — 80053 COMPREHEN METABOLIC PANEL: CPT | Performed by: EMERGENCY MEDICINE

## 2024-10-22 PROCEDURE — 81025 URINE PREGNANCY TEST: CPT

## 2024-10-22 PROCEDURE — 81001 URINALYSIS AUTO W/SCOPE: CPT | Performed by: EMERGENCY MEDICINE

## 2024-10-22 PROCEDURE — 81001 URINALYSIS AUTO W/SCOPE: CPT

## 2024-10-22 PROCEDURE — 81015 MICROSCOPIC EXAM OF URINE: CPT

## 2024-10-22 PROCEDURE — 96374 THER/PROPH/DIAG INJ IV PUSH: CPT

## 2024-10-22 PROCEDURE — 80053 COMPREHEN METABOLIC PANEL: CPT

## 2024-10-22 RX ORDER — KETOROLAC TROMETHAMINE 15 MG/ML
15 INJECTION, SOLUTION INTRAMUSCULAR; INTRAVENOUS ONCE
Status: COMPLETED | OUTPATIENT
Start: 2024-10-22 | End: 2024-10-22

## 2024-10-22 RX ORDER — LIDOCAINE 50 MG/G
1 PATCH TOPICAL EVERY 24 HOURS
Qty: 30 EACH | Refills: 0 | Status: SHIPPED | OUTPATIENT
Start: 2024-10-22

## 2024-10-22 RX ORDER — METHOCARBAMOL 500 MG/1
500 TABLET, FILM COATED ORAL 4 TIMES DAILY
Qty: 10 TABLET | Refills: 0 | Status: SHIPPED | OUTPATIENT
Start: 2024-10-22

## 2024-10-22 RX ORDER — KETOROLAC TROMETHAMINE 10 MG/1
10 TABLET, FILM COATED ORAL EVERY 6 HOURS PRN
Qty: 10 TABLET | Refills: 0 | Status: SHIPPED | OUTPATIENT
Start: 2024-10-22 | End: 2024-10-29

## 2024-10-22 NOTE — ED INITIAL ASSESSMENT (HPI)
Seen here in July with r flank pain and urinary complaints and was dx with UTI-- has had \"some\" r flank pain on and off since July but has been consistent for last few days- denies urinary complaints- pain improves with certain positions

## 2024-10-22 NOTE — DISCHARGE INSTRUCTIONS
Take Toradol 10 mg every 6 hours take Toradol 10 mg every 6 hours or as needed for pain.  Do not take any other NSAID medication with this such as ibuprofen or naproxen.  You can also switch to ibuprofen 600 mg every 6 hours or as needed for pain if Toradol is not helping.  Take Tylenol 500 mg every 4-6 hours or as needed.  You have been prescribed muscle relaxer, Robaxin, use as directed.  Start with 500 mg dose and can advance to 750 to 1000 mg, use with caution as will make you drowsy, start with by taking nightly and do not drive or operate heavy machinery with this medication.  Continue to use Lidoderm patches if helpful.  Apply ice or heat pack, gentle massage and avoid any heavy lifting until symptoms improved.  Call to make an appointment with your primary care doctor for follow-up.  Return to the ER for any other new or worsening symptoms.

## 2024-10-22 NOTE — ED PROVIDER NOTES
Patient Seen in: [facility] In [place]      History     Chief Complaint   Patient presents with    Abdomen/Flank Pain     Stated Complaint: right flank pain for past for past 3 days    Subjective:   HPI  49 year old female with past medical history of obesity and iron deficiency anemia presents to the ER for right sided flank pain.  Reports that pain has been intermittent for the past several months, since July of this year, this pain is worse over the past day.  Pain is nonradiating, slightly improved with Tylenol or ibuprofen.  Denies any associated urinary symptoms, nausea, vomiting, diarrhea, chest pain, shortness of breath or cough.  She has been worked up for this pain in the past including CT abdomen pelvis which was normal.  Patient denies history of kidney stones.  Also claims that she had ultrasound of her gallbladder completed that was normal, unable to review these results in her chart.  Denies any pain that is worse with eating, pain is entirely worse with movement.  Reports frequent heavy lifting at work.  No other complaints.    Objective:     Past Medical History:    Iron deficiency anemia    Obesity              Past Surgical History:   Procedure Laterality Date      ,,    D & c      Hc  section level i      Rotator cuff repair Left                 Social History     Socioeconomic History    Marital status:    Tobacco Use    Smoking status: Never     Passive exposure: Never    Smokeless tobacco: Never   Vaping Use    Vaping status: Never Used   Substance and Sexual Activity    Alcohol use: Never    Drug use: Never    Sexual activity: Yes     Partners: Male     Birth control/protection: OCP   Other Topics Concern    Caffeine Concern No    Exercise No    Seat Belt No    Special Diet No    Stress Concern No    Weight Concern No                  Physical Exam     ED Triage Vitals [10/22/24 1026]   /81   Pulse 99   Resp 16   Temp 97.9 °F  (36.6 °C)   Temp src Oral   SpO2 100 %   O2 Device None (Room air)       Current Vitals:   Vital Signs  BP: (!) 128/99  Pulse: 99  Resp: 16  Temp: 97.9 °F (36.6 °C)  Temp src: Oral    Oxygen Therapy  SpO2: 100 %  O2 Device: None (Room air)        Physical Exam  GENERAL APPEARANCE:  AxOx4, generally well-appearing, no acute distress.  HEENT:  NC, AT. MMM. EOMI, clear conjunctiva, oropharynx clear.  NECK:  Supple without lymphadenopathy.  No stiffness or restricted ROM.  HEART:  Normal rate and regular rhythm, normal S1/S1, no m/r/g  LUNGS:  CTAB, moving air well. No crackles or wheezes are heard.  ABDOMEN:  Soft, nontender, nondistended with good bowel sounds heard.  BACK: No CVAT, no midline spinal tenderness noted, there is right-sided lumbar paraspinal tenderness noted.  No obvious deformity.  EXTREMITIES:  Without cyanosis, clubbing or edema.  NEUROLOGICAL:  Grossly nonfocal. Alert and oriented, moving all 4 extremities. CN not formally tested but appear grossly intact. Observed to ambulate with normal gait.  Skin:  Warm and dry without any rash.        ED Course     Labs Reviewed   COMP METABOLIC PANEL (14) - Abnormal; Notable for the following components:       Result Value    Glucose 123 (*)     Potassium 3.4 (*)     AST 12 (*)     A/G Ratio 0.8 (*)     All other components within normal limits   URINALYSIS WITH CULTURE REFLEX - Abnormal; Notable for the following components:    Blood Urine Large (*)     Protein Urine Trace (*)     All other components within normal limits   UA MICROSCOPIC ONLY, URINE - Abnormal; Notable for the following components:    RBC Urine >10 (*)     Bacteria Urine Rare (*)     Squamous Epi. Cells Moderate (*)     All other components within normal limits   POCT PREGNANCY URINE - Normal   CBC WITH DIFFERENTIAL WITH PLATELET          XR LUMBAR SPINE (MIN 2 VIEWS) (CPT=72100)    Result Date: 10/22/2024  PROCEDURE:  XR LUMBAR SPINE (MIN 2 VIEWS) (CPT=72100)  TECHNIQUE:  AP, lateral, and  coned down L5-S1 views were obtained.  COMPARISON:  None.  INDICATIONS:  right flank pain for past for past 3 days  PATIENT STATED HISTORY: (As transcribed by Technologist)  Patient had onset of lower back pain in July 2024 and was diagnosed with a UTI.  She has been having intermittent right sided lower back since that time.  The pain increases with movement.  On 10/21/24 she began having generalized lower abdominal discomfort as well.     FINDINGS:    Normal alignment of the lumbar spine.  No acute osseous injury/fractures.  No vertebral body compression deformities are noted.  There is mild multilevel disc disease with multilevel endplate osteophytes.  No radiographic evidence of significant facet arthropathy or spondylolysis.  The paraspinal soft tissues are unremarkable.            CONCLUSION:  1. No acute process identified. 2. There is mild multilevel degenerative disc disease.   LOCATION:  [facility]   Dictated by (CST): David Brooks DO on 10/22/2024 at 12:23 PM     Finalized by (CST): David Brooks DO on 10/22/2024 at 12:24 PM             MDM      Healthy 49-year-old female who presents to the ER for right flank pain intermittent for the past several months, worse since yesterday.  Vitals within normal limits.  Differential diagnosis includes but does not exclude pyelonephritis versus kidney stone versus musculoskeletal back pain versus bony mass versus cholecystitis.  Right sided lumbar paraspinal tenderness noted but no other findings on exam.  Reviewed patient's chart, had CT ab pelvis that was completed in July which was normal, did show normal-appearing gallbladder at that time as well.  Unable to see the ultrasound of her gallbladder the patient claims she had completed.  Labs reassuring including normal CBC, CMP.  UA consistent with blood but no signs of infection, patient has active vaginal bleeding which she is being seen by her gynecologist for.  X-ray lumbar spine showed degenerative changes but no  other acute findings.  Feeling better with pain medications given here.  Discussed continued supportive management at home, likely diagnosis of musculoskeletal back pain given prior normal workups.  Discussed need for follow-up with PCP for additional management as well as return precautions.  She understands and agrees plan ready for discharge.        Medical Decision Making      Disposition and Plan     Clinical Impression:  1. Acute right-sided low back pain without sciatica         Disposition:  Discharge  10/22/2024  1:06 pm    Follow-up:  Jean Pierre Bright DO  [address] [address]  [phone]    Follow up            Medications Prescribed:  Discharge Medication List as of 10/22/2024  1:08 PM        START taking these medications    Details   Ketorolac Tromethamine 10 MG Oral Tab Take 1 tablet (10 mg total) by mouth every 6 (six) hours as needed for Pain., Normal, Disp-10 tablet, R-0      methocarbamol 500 MG Oral Tab Take 1 tablet (500 mg total) by mouth 4 (four) times daily., Normal, Disp-10 tablet, R-0      lidocaine 5 % External Patch Place 1 patch onto the skin daily., Normal, Disp-30 each, R-0                 Supplementary Documentation:

## 2024-10-23 ENCOUNTER — TELEPHONE (OUTPATIENT)
Dept: INTERNAL MEDICINE CLINIC | Facility: CLINIC | Age: 49
End: 2024-10-23

## 2024-10-23 ENCOUNTER — PATIENT MESSAGE (OUTPATIENT)
Dept: FAMILY MEDICINE CLINIC | Facility: CLINIC | Age: 49
End: 2024-10-23

## 2024-10-23 NOTE — TELEPHONE ENCOUNTER
Per MD/MA, tried to move patient to tomorrow due to openings. Patient declined. Advised ok to keep appointment. Patient wished to cancel.

## 2024-10-23 NOTE — TELEPHONE ENCOUNTER
Pt called. She states she was seen in ER yesterday for back pain. She has already missed 3 days of work and cannot miss any more days. She is requesting referral to PT or ortho for her back pain.    Dr. Bright - does pt need to follow up with you first or would you provide referral? If follow-up with you is needed, are you able to see pt today?

## 2024-10-25 NOTE — TELEPHONE ENCOUNTER
Left Message for patient to call the office to schedule an ER follow-up appointment to see Dr. Bright.  I held an appointment on Dr. Bright's schedule for Monday, October 28 at 10:20 am.  Waiting for patient to call and schedule this appointment.

## 2024-10-26 NOTE — TELEPHONE ENCOUNTER
Clinical Nurse asked that I call patient to see if she wants to come in Monday 10-28-24  (Patient scheduled through My Chart for 10-31-24 @ 8:40 am).  As per Patient she cannot come in on Monday due to work.

## 2024-10-26 NOTE — TELEPHONE ENCOUNTER
Future Appointments   Date Time Provider Department Center   10/31/2024  8:40 AM Jean Pierre Bright DO [facility]   8/26/2025  5:30 PM Diana Dean MD [facility]

## 2024-10-31 ENCOUNTER — OFFICE VISIT (OUTPATIENT)
Dept: FAMILY MEDICINE CLINIC | Facility: CLINIC | Age: 49
End: 2024-10-31
Payer: COMMERCIAL

## 2024-10-31 VITALS
RESPIRATION RATE: 16 BRPM | HEIGHT: 60 IN | TEMPERATURE: 98 F | BODY MASS INDEX: 34.95 KG/M2 | SYSTOLIC BLOOD PRESSURE: 126 MMHG | WEIGHT: 178 LBS | OXYGEN SATURATION: 99 % | HEART RATE: 78 BPM | DIASTOLIC BLOOD PRESSURE: 80 MMHG

## 2024-10-31 DIAGNOSIS — D50.8 OTHER IRON DEFICIENCY ANEMIA: ICD-10-CM

## 2024-10-31 DIAGNOSIS — M51.360 DEGENERATION OF INTERVERTEBRAL DISC OF LUMBAR REGION WITH DISCOGENIC BACK PAIN: Primary | ICD-10-CM

## 2024-10-31 DIAGNOSIS — Z00.00 LABORATORY EXAM ORDERED AS PART OF ROUTINE GENERAL MEDICAL EXAMINATION: ICD-10-CM

## 2024-10-31 DIAGNOSIS — Z83.3 FAMILY HISTORY OF DIABETES MELLITUS: ICD-10-CM

## 2024-10-31 PROCEDURE — 99214 OFFICE O/P EST MOD 30 MIN: CPT | Performed by: FAMILY MEDICINE

## 2024-10-31 RX ORDER — METHOCARBAMOL 500 MG/1
500 TABLET, FILM COATED ORAL 2 TIMES DAILY PRN
Qty: 30 TABLET | Refills: 0 | Status: SHIPPED | OUTPATIENT
Start: 2024-10-31

## 2024-10-31 NOTE — PROGRESS NOTES
HPI:   Faina Jones is a 49 year old female who is here for f/u from ER visit for c/o R sided low back pain radiating into her buttock/hip. Describes it as aching, sharp. Severity is moderate.  Pain was precipitated by unknown. Has had for 3  months. Pain is worsened by prolonged standing and laying down in certain positions. Seen in the ED and had imaging done demonstrating degenerative disc disease and given nsaids, muscle relaxant and lidoderm path, all of which are helping with apin relief. Denies any paresthesias or weakness. No loss of bowel/bladder function.      Current Outpatient Medications   Medication Sig Dispense Refill    methocarbamol 500 MG Oral Tab Take 1 tablet (500 mg total) by mouth 2 (two) times daily as needed. 30 tablet 0    lidocaine 5 % External Patch Place 1 patch onto the skin daily. 30 each 0    Drospirenone (SLYND) 4 MG Oral Tab Take 4 mg by mouth daily. 28 tablet 12    FERROUS GLUCONATE IRON OR        Past Medical History:    Iron deficiency anemia    Obesity       Past Surgical History:   Procedure Laterality Date      ,,    D & c      Hc  section level i      Rotator cuff repair Left        Family History   Problem Relation Age of Onset    Hypertension Father     Diabetes Father     Lipids Father     Hypertension Mother     Cancer Maternal Grandmother      SOCIAL HISTORY:  Social History     Socioeconomic History    Marital status:    Tobacco Use    Smoking status: Never     Passive exposure: Never    Smokeless tobacco: Never   Vaping Use    Vaping status: Never Used   Substance and Sexual Activity    Alcohol use: Never    Drug use: Never    Sexual activity: Yes     Partners: Male     Birth control/protection: OCP   Other Topics Concern    Caffeine Concern No    Exercise No    Seat Belt No    Special Diet No    Stress Concern No    Weight Concern No         REVIEW OF SYSTEMS:   GENERAL: feels well otherwise  SKIN: denies any unusual skin  lesions  LUNGS: denies shortness of breath with exertion  CARDIOVASCULAR: denies chest pain on exertion  GI: denies abdominal pain,denies heartburn  MUSCULOSKELETAL: no other joints are affected    EXAM:   /80   Pulse 78   Temp 97.5 °F (36.4 °C) (Temporal)   Resp 16   Ht 5' (1.524 m)   Wt 178 lb (80.7 kg)   LMP 10/17/2024 (Exact Date)   SpO2 99%   BMI 34.76 kg/m²    GENERAL: well developed, well nourished,in no apparent distress, obese  SKIN: no rashes,no suspicious lesions  NECK: supple   LUNGS: clear to auscultation, no w/r/r  CARDIO: RRR without murmur   EXTREMITIES: no cyanosis, clubbing or edema  BACK: lumbosacral tenderness, R piriformis tenderness, is able to flex 90 degrees, sensation is intact, neg straight leg bilat    ASSESSMENT/PLAN:   Fainaecho Jones is a 49 year old female who presents with:  1. Degeneration of intervertebral disc of lumbar region with discogenic back pain    - XR of l-spine (10/22/24): 1. No acute process identified. 2. There is mild multilevel degenerative disc disease  - advised to cont symptomatic tx: ice/heat, nsaids prn and HEP  - will provide rx for methocarbamol  - refer to PT   - monitor for any worsening s/s  - Physical Therapy Referral - [facility]  - methocarbamol 500 MG Oral Tab; Take 1 tablet (500 mg total) by mouth 2 (two) times daily as needed.  Dispense: 30 tablet; Refill: 0    2. Other iron deficiency anemia  - CBC With Differential With Platelet; Future  - Ferritin [E]; Future    3. Family history of diabetes mellitus  - Hemoglobin A1C; Future    4. Laboratory exam ordered as part of routine general medical examination  - CBC With Differential With Platelet; Future  - Comp Metabolic Panel; Future  - Lipid Panel; Future  - TSH W Reflex To Free T4; Future      The patient indicates understanding of these issues and agrees to the plan.  The patient is asked to return if sx's persist or worsen, otherwise in upcoming weeks for annual physical

## 2024-11-06 ENCOUNTER — TELEPHONE (OUTPATIENT)
Dept: PHYSICAL THERAPY | Facility: HOSPITAL | Age: 49
End: 2024-11-06

## 2024-11-07 ENCOUNTER — OFFICE VISIT (OUTPATIENT)
Dept: PHYSICAL THERAPY | Age: 49
End: 2024-11-07
Attending: FAMILY MEDICINE
Payer: COMMERCIAL

## 2024-11-07 DIAGNOSIS — M51.360 DEGENERATION OF INTERVERTEBRAL DISC OF LUMBAR REGION WITH DISCOGENIC BACK PAIN: Primary | ICD-10-CM

## 2024-11-07 PROCEDURE — 97161 PT EVAL LOW COMPLEX 20 MIN: CPT

## 2024-11-07 PROCEDURE — 97110 THERAPEUTIC EXERCISES: CPT

## 2024-11-08 NOTE — PATIENT INSTRUCTIONS
Access Code: [email or web address]  URL: [email or web address]  Date: 11/07/2024  Prepared by: Eamon Pimentel    Exercises  - Supine Posterior Pelvic Tilt  - 3 x daily - 7 x weekly - 1 sets - 10 reps - 10 sec hold  - Supine Lower Trunk Rotation  - 3 x daily - 7 x weekly - 1 sets - 10 reps - 3 sec hold  - Supine Piriformis Stretch with Leg Straight  - 3 x daily - 7 x weekly - 1 sets - 3 reps - 10 sec hold  - Supine Single Knee to Chest Stretch  - 3 x daily - 7 x weekly - 1 sets - 5 reps - 10 sec hold  - Seated Lumbar Flexion Stretch  - 3 x daily - 7 x weekly - 1 sets - 5 reps - 10 hold

## 2024-11-12 ENCOUNTER — OFFICE VISIT (OUTPATIENT)
Dept: PHYSICAL THERAPY | Age: 49
End: 2024-11-12
Attending: FAMILY MEDICINE
Payer: COMMERCIAL

## 2024-11-12 PROCEDURE — 97110 THERAPEUTIC EXERCISES: CPT

## 2024-11-12 PROCEDURE — 97140 MANUAL THERAPY 1/> REGIONS: CPT

## 2024-11-12 PROCEDURE — 97112 NEUROMUSCULAR REEDUCATION: CPT

## 2024-11-12 NOTE — PROGRESS NOTES
Diagnosis:   Degeneration of intervertebral disc of lumbar region with discogenic back pain (M51.360)       Referring Provider: Jean Pierre Bright  Date of Evaluation:    11/7/2024    Precautions:  None Next MD visit:   none scheduled  Date of Surgery: n/a   Insurance Primary/Secondary: [employer] / N/A     # Auth Visits: 10 recommended            Subjective: no new problems. Working on HEP daily 1-2x/day. 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 Does feel a little better since starting the exercises.     Pain: 1-2/10 discomfort in the right LB      Objective:   Lumbar AROM: (* denotes performed with pain)  Flexion: 95 deg  Extension: 35 deg*  Sidebending: R 40 deg; L 35 deg*--pain on R  Rotation: R 50%*; L 50%     Accessory motion: Pain with CPA T12-L1-2-3  Palpation: moderate tenderness on palpation of right lumbar paraspinals and gluteals     Strength: (* denotes performed with pain)  LE   Hip flexion (L2): R 5/5; L 5/5  Hip abduction: R 3-/5; L 3-/5  Hip Extension: R 3-/5; L 3-/5            Hip ER: R 4-/5; L 4-/5  Hip IR: R 5/5; L 55/5  Knee Flexion: R 5/5; L 5/5            Knee extension (L3): R 5/5; L 5/5             Assessment: completed todays treatment without c/o increased pain or discomfort following treatment. Good tolerance for LE strengthening and LP stabilization. Required verbal cues to avoid pelvic rotation during clamshells and keep hip in extension during hip abduction in s/l. Also with keeping knees behind toes during squats.  Added clamshells and s/l hip abd to HEP      Goals:    (to be met in 10 visits)   Pt will improve transversus abdominis recruitment to perform proper isometric contraction without requiring verbal or tactile cuing to promote advancement of therex   Pt will demonstrate good understanding of proper posture and body mechanics to decrease pain and improve spinal safety   Pt to report ability to sleep through the night with  minimal LBP   Pt to reports ability to bend forward with minimal to no LBP   Pt will demonstrate improved hip abd and ER strength to 4+/5 to improve ease with work duties   Pt will be independent and compliant with comprehensive HEP to maintain progress achieved in PT    Plan: continue POC. Progress as tolerated.   Date: 11/12/2024  TX#: 2/10 Date:                 TX#: 3/ Date:                 TX#: 4/ Date:                 TX#: 5/ Date:   Tx#: 6/   Nu step L4 x5 min       YTB lateral and monster band walking 35ft x 2 ea       Reformer 4 bands double knee extension  Feet apart x20  Feet together x20  Feet in V x20       TRX squats x10       ASU 4\" step R/L LE lead x10 ea       Hkly: Pelvic tilt 10x10 sec  Hkly: LTR x10 ea       Manual piriformis stretch R  3x10 sec       R SKTC 3x 20 sec       Hkly:TrA bilat hip abd RTB 2x10       L/R s/l clamshells x10  L/R s/l hip abd x10       L s/l: R STM with muscle stick R ITB and gluteals x5 min  L s/l: R LP rotation mobilizations Gr 3 5 x10 sec bouts       HEP: Access Code: [email or web address]  URL: [email or web address]  Date: 11/07/2024  Prepared by: Eamon Pimentel     Exercises  - Supine Posterior Pelvic Tilt  - 3 x daily - 7 x weekly - 1 sets - 10 reps - 10 sec hold  - Supine Lower Trunk Rotation  - 3 x daily - 7 x weekly - 1 sets - 10 reps - 3 sec hold  - Supine Piriformis Stretch with Leg Straight  - 3 x daily - 7 x weekly - 1 sets - 3 reps - 10 sec hold  - Supine Single Knee to Chest Stretch  - 3 x daily - 7 x weekly - 1 sets - 5 reps - 10 sec hold  - Seated Lumbar Flexion Stretch  - 3 x daily - 7 x weekly - 1 sets - 5 reps - 10 hold  11/12/2024  Clamshell  - 3 x daily - 7 x weekly - 1 sets - 10 reps - 3 sec hold  - Sidelying Hip Abduction  - 3 x daily - 7 x weekly - 1 sets - 10 reps - 3 sec hold  Charges: Ex 1 (15) NReed 1 (20)   MT 1 (10)    Total Timed Treatment: 45 min  Total Treatment Time: 45 min

## 2024-11-19 ENCOUNTER — OFFICE VISIT (OUTPATIENT)
Dept: PHYSICAL THERAPY | Age: 49
End: 2024-11-19
Attending: FAMILY MEDICINE
Payer: COMMERCIAL

## 2024-11-19 PROCEDURE — 97140 MANUAL THERAPY 1/> REGIONS: CPT

## 2024-11-19 PROCEDURE — 97110 THERAPEUTIC EXERCISES: CPT

## 2024-11-19 PROCEDURE — 97112 NEUROMUSCULAR REEDUCATION: CPT

## 2024-11-19 NOTE — PROGRESS NOTES
Diagnosis:   Degeneration of intervertebral disc of lumbar region with discogenic back pain (M51.360)       Referring Provider: Jean Pierre Bright  Date of Evaluation:    11/7/2024    Precautions:  None Next MD visit:   none scheduled  Date of Surgery: n/a   Insurance Primary/Secondary: [employer] / N/A     # Auth Visits: 10 recommended            Subjective: felt good after last session. 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 Running around after them a lot.  Sunday and monday noticed a lot of left groin and thigh pain--more pronounced with sitting, better if walking.  Had to take mm relaxer and extra strength tylenol yesterday and today for the pain which she has not used in a while.     Pain: 4/10 discomfort in the right LB, 5-6/10 in the left thigh knee      Objective:   Lumbar AROM: (* denotes performed with pain)  Flexion: 95 deg  Extension: 35 deg*  Sidebending: R 40 deg; L 35 deg*--pain on R  Rotation: R 50%*; L 50%     Accessory motion: Pain with CPA T12-L1-2-3  Palpation: moderate tenderness on palpation of right lumbar paraspinals and gluteals     Strength: (* denotes performed with pain)  LE   Hip flexion (L2): R 5/5; L 5/5  Hip abduction: R 3-/5; L 3-/5  Hip Extension: R 3-/5; L 3-/5            Hip ER: R 4-/5; L 4-/5  Hip IR: R 5/5; L 55/5  Knee Flexion: R 5/5; L 5/5            Knee extension (L3): R 5/5; L 5/5             Assessment: pt came in with antalgic gait and c/o pain in left groin and anterior thigh which she states came on sunday. 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 Reassessment today groin and thigh pain consistent with femoral nerve tension. Femoral nerve glides improved her groin and thigh pain but being prone aggravated her right LBP. Responded very well to LP rotation mobilization gr 4 today for R LBP which is consistent with facet arthropathy. Encouraged seated lumbar flexion and KTC stretches for R LBP. 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 Pt states understanding.     Goals:    (to be met in 10 visits)   Pt will improve transversus abdominis recruitment to perform proper isometric contraction without requiring verbal or tactile cuing to promote advancement of therex   Pt will demonstrate good understanding of proper posture and body mechanics to decrease pain and improve spinal safety   Pt to report ability to sleep through the night with minimal LBP   Pt to reports ability to bend forward with minimal to no LBP   Pt will demonstrate improved hip abd and ER strength to 4+/5 to improve ease with work duties   Pt will be independent and compliant with comprehensive HEP to maintain progress achieved in PT    Plan: continue POC. Progress as tolerated.   Date: 11/12/2024  TX#: 2/10 Date:  11/19/2024              TX#: 3/10 Date:                 TX#: 4/ Date:                 TX#: 5/ Date:   Tx#: 6/   Nu step L4 x5 min NU step L4 x5 min      YTB lateral and monster band walking 35ft x 2 ea Supine: STM with muscle stick L quad x 5 min      Reformer 4 bands double knee extension  Feet apart x20  Feet together x20  Feet in V x20 Mulligan left hip mobilizations Gr 4  lateral and inferior mobilizations 5 x10 sec bouts  L hip long axis distraction 5 20 sec bouts      TRX squats x10 Prone femoral nerve glides x10 ea      ASU 4\" step R/L LE lead x10 ea Hkly: pelvic tilt 10 x10 sec      Hkly: Pelvic tilt 10x10 sec  Hkly: LTR x10 ea Hkly: LTR x10 ea      Manual piriformis stretch R  3x10 sec       R SKTC 3x 20 sec R/L SKTC 3x 20 sec      Hkly:TrA bilat hip abd RTB 2x10 Hkly:TrA bilat hip abd RTB 2x10      L/R s/l clamshells x10  L/R s/l hip abd x10 Seated lumbar flexion stretch 5 x10 sec      L s/l: R STM with muscle stick R ITB and gluteals x5 min  L s/l: R LP rotation mobilizations Gr 3 5 x10 sec bouts L s/l: R LP rotation mobilizations Gr 4 8 x10 sec  bouts  Reformer 4 bands  Double knee ext with TrA 3x 10 reps ( head elevated)      HEP: Access Code: [email or web address]  URL: [email or web address]  Date: 11/07/2024  Prepared by: Eamon Pimentel     Exercises  - Supine Posterior Pelvic Tilt  - 3 x daily - 7 x weekly - 1 sets - 10 reps - 10 sec hold  - Supine Lower Trunk Rotation  - 3 x daily - 7 x weekly - 1 sets - 10 reps - 3 sec hold  - Supine Piriformis Stretch with Leg Straight  - 3 x daily - 7 x weekly - 1 sets - 3 reps - 10 sec hold  - Supine Single Knee to Chest Stretch  - 3 x daily - 7 x weekly - 1 sets - 5 reps - 10 sec hold  - Seated Lumbar Flexion Stretch  - 3 x daily - 7 x weekly - 1 sets - 5 reps - 10 hold  11/12/2024  Clamshell  - 3 x daily - 7 x weekly - 1 sets - 10 reps - 3 sec hold  - Sidelying Hip Abduction  - 3 x daily - 7 x weekly - 1 sets - 10 reps - 3 sec hold  Charges: Ex 1 (15) NReed 1 (20)   MT 1 (10)    Total Timed Treatment: 45 min  Total Treatment Time: 45 min

## 2024-11-21 ENCOUNTER — TELEPHONE (OUTPATIENT)
Dept: PHYSICAL THERAPY | Facility: HOSPITAL | Age: 49
End: 2024-11-21

## 2024-11-26 ENCOUNTER — APPOINTMENT (OUTPATIENT)
Dept: PHYSICAL THERAPY | Age: 49
End: 2024-11-26
Attending: FAMILY MEDICINE
Payer: COMMERCIAL

## 2024-11-29 ENCOUNTER — APPOINTMENT (OUTPATIENT)
Dept: PHYSICAL THERAPY | Age: 49
End: 2024-11-29
Attending: FAMILY MEDICINE
Payer: COMMERCIAL

## 2024-12-05 ENCOUNTER — APPOINTMENT (OUTPATIENT)
Dept: PHYSICAL THERAPY | Age: 49
End: 2024-12-05
Attending: FAMILY MEDICINE
Payer: COMMERCIAL

## 2024-12-10 ENCOUNTER — OFFICE VISIT (OUTPATIENT)
Dept: PHYSICAL THERAPY | Age: 49
End: 2024-12-10
Attending: FAMILY MEDICINE
Payer: COMMERCIAL

## 2024-12-10 PROCEDURE — 97140 MANUAL THERAPY 1/> REGIONS: CPT

## 2024-12-10 PROCEDURE — 97112 NEUROMUSCULAR REEDUCATION: CPT

## 2024-12-10 PROCEDURE — 97110 THERAPEUTIC EXERCISES: CPT
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
## 2024-12-12 ENCOUNTER — APPOINTMENT (OUTPATIENT)
Dept: PHYSICAL THERAPY | Age: 49
End: 2024-12-12
Attending: FAMILY MEDICINE
Payer: COMMERCIAL

## 2024-12-17 ENCOUNTER — APPOINTMENT (OUTPATIENT)
Dept: PHYSICAL THERAPY | Age: 49
End: 2024-12-17
Attending: FAMILY MEDICINE
Payer: COMMERCIAL

## 2024-12-18 ENCOUNTER — APPOINTMENT (OUTPATIENT)
Dept: PHYSICAL THERAPY | Age: 49
End: 2024-12-18
Attending: FAMILY MEDICINE
Payer: COMMERCIAL

## 2024-12-18 ENCOUNTER — TELEPHONE (OUTPATIENT)
Dept: PHYSICAL THERAPY | Facility: HOSPITAL | Age: 49
End: 2024-12-18

## 2024-12-19 ENCOUNTER — APPOINTMENT (OUTPATIENT)
Dept: PHYSICAL THERAPY | Age: 49
End: 2024-12-19
Attending: FAMILY MEDICINE
Payer: COMMERCIAL

## 2024-12-24 ENCOUNTER — APPOINTMENT (OUTPATIENT)
Dept: PHYSICAL THERAPY | Age: 49
End: 2024-12-24
Attending: FAMILY MEDICINE
Payer: COMMERCIAL

## 2024-12-26 ENCOUNTER — OFFICE VISIT (OUTPATIENT)
Dept: PHYSICAL THERAPY | Age: 49
End: 2024-12-26
Attending: FAMILY MEDICINE
Payer: COMMERCIAL

## 2024-12-26 PROCEDURE — 97112 NEUROMUSCULAR REEDUCATION: CPT

## 2024-12-26 PROCEDURE — 97140 MANUAL THERAPY 1/> REGIONS: CPT

## 2024-12-26 PROCEDURE — 97110 THERAPEUTIC EXERCISES: CPT

## 2024-12-26 NOTE — PROGRESS NOTES
Diagnosis:   Degeneration of intervertebral disc of lumbar region with discogenic back pain (M51.360)       Referring Provider: Jean Pierre Bright  Date of Evaluation:    11/7/2024    Precautions:  None Next MD visit:   none scheduled  Date of Surgery: n/a   Insurance Primary/Secondary: [employer] [facility] / N/A     # Auth Visits: 10 recommended            Subjective: states that she was sick with COVID the past 2 weeks so was not able to work on her HEP. Was very fatigued and had allover body aches with her lower back being the worst. Has been taking advil 400 mg 2x/day to manage her pain and body aches. The left thigh and knee pain has remained resolved. Her lower thoracic pain she had last time she was here has improved greatly. Has appt with an orthopedic for evaluation of her LB on 12/31 @ 4pm at [facility].   Pain: 4-5/10 discomfort in the right LB, 0/10 in the left thigh knee, + right thigh tightness      Objective:   Lumbar AROM: (* denotes performed with pain)  Flexion: 95 deg  Extension: 35 deg*  Sidebending: R 40 deg; L 35 deg*--pain on R  Rotation: R 50%*; L 50%     Accessory motion: Pain with CPA T12-L1-2-3  Palpation: moderate tenderness on palpation of right lumbar paraspinals and gluteals     Strength: (* denotes performed with pain)  LE   Hip flexion (L2): R 5/5; L 5/5  Hip abduction: R 3-/5; L 3-/5  Hip Extension: R 3-/5; L 3-/5            Hip ER: R 4-/5; L 4-/5  Hip IR: R 5/5; L 55/5  Knee Flexion: R 5/5; L 5/5            Knee extension (L3): R 5/5; L 5/5             Assessment: completed todays treatment without increases in pain or discomfort. Still has some lumbar paraspinal and R lat dorsi tenderness on palpation today which improved with STM.  Responds well to flexion based exercises. Encouraged consistency with HEP for optimal outcomes. Her LB symptoms seem consistent with spinal stenosis and facet arthropathy. Thoracic symptoms consistent with latissimus dorsi strain. Did report  feeling improvement of pain in LB to 2/10 post treatment.        Goals:    (to be met in 10 visits)   Pt will improve transversus abdominis recruitment to perform proper isometric contraction without requiring verbal or tactile cuing to promote advancement of therex   Pt will demonstrate good understanding of proper posture and body mechanics to decrease pain and improve spinal safety   Pt to report ability to sleep through the night with minimal LBP   Pt to reports ability to bend forward with minimal to no LBP   Pt will demonstrate improved hip abd and ER strength to 4+/5 to improve ease with work duties   Pt will be independent and compliant with comprehensive HEP to maintain progress achieved in PT    Plan: continue POC. Progress as tolerated.   Date: 11/12/2024  TX#: 2/10 Date:  11/19/2024              TX#: 3/10 Date: 12/10/2024                TX#: 4/10 Date: 12/26/2024                TX#: 5/10 Date:   Tx#: 6/   Nu step L4 x5 min NU step L4 x5 min NU step L4 x5 min NU step L4 x5 min    YTB lateral and monster band walking 35ft x 2 ea Supine: STM with muscle stick L quad x 5 min Supine: STM with muscle stick L quad x 5 min Supine: STM with muscle stick L quad x 5 min    Reformer 4 bands double knee extension  Feet apart x20  Feet together x20  Feet in V x20 Mulligan left hip mobilizations Gr 4  lateral and inferior mobilizations 5 x10 sec bouts  L hip long axis distraction 5 20 sec bouts --     TRX squats x10 Prone femoral nerve glides x10 ea --     ASU 4\" step R/L LE lead x10 ea Hkly: pelvic tilt 10 x10 sec Hkly: pelvic tilt 10 x10 sec Hkly: pelvic tilt 10 x10 sec    Hkly: Pelvic tilt 10x10 sec  Hkly: LTR x10 ea Hkly: LTR x10 ea Hkly: LTR x10 ea Hkly: LTR x10 ea    Manual piriformis stretch R  3x10 sec  Manual piriformis stretch R  3x10 sec Manual piriformis stretch R  3x10 sec    R SKTC 3x 20 sec R/L SKTC 3x 20 sec R/L SKTC 3x 20 sec- manual R/L SKTC 3x 20 sec- manual    Hkly:TrA bilat hip abd RTB 2x10  Hkly:TrA bilat hip abd RTB 2x10 Hkly:TrA bilat hip abd RTB 2x10 Hkly:TrA bilat hip abd RTB 2x10    L/R s/l clamshells x10  L/R s/l hip abd x10 Seated lumbar flexion stretch 5 x10 sec Seated lumbar flexion stretch 5 x10 sec Seated lumbar flexion stretch 5 x10 sec    L s/l: R STM with muscle stick R ITB and gluteals x5 min  L s/l: R LP rotation mobilizations Gr 3 5 x10 sec bouts L s/l: R LP rotation mobilizations Gr 4 8 x10 sec bouts  Reformer 4 bands  Double knee ext with TrA 3x 10 reps ( head elevated) L s/l: R LP rotation mobilizations Gr 4 8 x10 sec bouts  STM R lat dorsi x8 min  Standing lumbar flexion off counter central and R bias 4 x10 sec ea   Seated chest stretch x10  Standing wall lat stretch   L s/l: R LP rotation mobilizations Gr 4 8 x10 sec bouts  STM R lat dorsi x8 min  Standing lumbar flexion off counter central and R bias 4 x10 sec ea   Seated chest stretch x10  Standing wall lat stretch 5 x 10 sec    HEP: Access Code: [email or web address]  URL: [email or web address]  Date: 11/07/2024  Prepared by: Eamon Pimentel     Exercises  - Supine Posterior Pelvic Tilt  - 3 x daily - 7 x weekly - 1 sets - 10 reps - 10 sec hold  - Supine Lower Trunk Rotation  - 3 x daily - 7 x weekly - 1 sets - 10 reps - 3 sec hold  - Supine Piriformis Stretch with Leg Straight  - 3 x daily - 7 x weekly - 1 sets - 3 reps - 10 sec hold  - Supine Single Knee to Chest Stretch  - 3 x daily - 7 x weekly - 1 sets - 5 reps - 10 sec hold  - Seated Lumbar Flexion Stretch  - 3 x daily - 7 x weekly - 1 sets - 5 reps - 10 hold  11/12/2024  Clamshell  - 3 x daily - 7 x weekly - 1 sets - 10 reps - 3 sec hold  - Sidelying Hip Abduction  - 3 x daily - 7 x weekly - 1 sets - 10 reps - 3 sec hold  12/10/2024  Latissimus Dorsi Stretch at Wall  - 1 x daily - 7 x weekly - 1 sets - 3 reps - 10 hold  - Standing Lumbar Spine Flexion Stretch Counter  - 3 x daily - 7 x weekly - 1 sets - 3 reps - 10 sec hold  - Seated Chest Stretch with Hands Behind  Head  - 1 x daily - 7 x weekly - 1 sets - 10 reps - 3-5 sec hold  Charges: Ex 1 (20) NReed 1 (25)   MT 1 (10)    Total Timed Treatment: 45 min  Total Treatment Time: 45 min

## 2024-12-30 ENCOUNTER — OFFICE VISIT (OUTPATIENT)
Dept: PHYSICAL THERAPY | Age: 49
End: 2024-12-30
Attending: FAMILY MEDICINE
Payer: COMMERCIAL

## 2024-12-30 PROCEDURE — 97110 THERAPEUTIC EXERCISES: CPT

## 2024-12-30 PROCEDURE — 97140 MANUAL THERAPY 1/> REGIONS: CPT

## 2024-12-30 PROCEDURE — 97112 NEUROMUSCULAR REEDUCATION: CPT

## 2024-12-30 NOTE — PROGRESS NOTES
Diagnosis:   Degeneration of intervertebral disc of lumbar region with discogenic back pain (M51.360)       Referring Provider: Jean Pierre Bright  Date of Evaluation:    11/7/2024    Precautions:  None Next MD visit:   none scheduled  Date of Surgery: n/a   Insurance Primary/Secondary: [employer] [facility] / N/A     # Auth Visits: 10 recommended            Subjective: no new problems. States that she is feeling better with her LBP and R thigh tightness. States that she takes advil daily 400 mg/200mg. Still on vacation from work and will go back to work on 1/7/2025.  Pain: 2/10 discomfort in the right LB, 0/10 in the left thigh knee, + right thigh tightness  4-5/10 pain at worst    Objective:   Lumbar AROM: (* denotes performed with pain)  Flexion: 95 deg  Extension: 35 deg*  Sidebending: R 40 deg; L 35 deg*--pain on R  Rotation: R 50%*; L 50%     Accessory motion: Pain with CPA T12-L1-2-3  Palpation: moderate tenderness on palpation of right lumbar paraspinals and gluteals     Strength: (* denotes performed with pain)  LE   Hip flexion (L2): R 5/5; L 5/5  Hip abduction: R 3-/5; L 3-/5  Hip Extension: R 3-/5; L 3-/5            Hip ER: R 4-/5; L 4-/5  Hip IR: R 5/5; L 55/5  Knee Flexion: R 5/5; L 5/5            Knee extension (L3): R 5/5; L 5/5             Assessment: completed todays treatment without increases in pain or discomfort. Still has some lumbar paraspinal tenderness on palpation today which improved with STM. No lat dorsi tenderness noted today. Continues to have good response to lumbar flexion based exercises. Tolerated LP stabilization and hip strengthening progressions well today.        Goals:    (to be met in 10 visits)   Pt will improve transversus abdominis recruitment to perform proper isometric contraction without requiring verbal or tactile cuing to promote advancement of therex   Pt will demonstrate good understanding of proper posture and body mechanics to decrease pain and improve spinal  safety   Pt to report ability to sleep through the night with minimal LBP   Pt to reports ability to bend forward with minimal to no LBP   Pt will demonstrate improved hip abd and ER strength to 4+/5 to improve ease with work duties   Pt will be independent and compliant with comprehensive HEP to maintain progress achieved in PT    Plan: continue POC. Progress as tolerated.   Date: 11/12/2024  TX#: 2/10 Date:  11/19/2024              TX#: 3/10 Date: 12/10/2024                TX#: 4/10 Date: 12/26/2024                TX#: 5/10 Date: 12/30/2024  Tx#: 6/10   Nu step L4 x5 min NU step L4 x5 min NU step L4 x5 min NU step L4 x5 min NU step L4 x5 min   YTB lateral and monster band walking 35ft x 2 ea Supine: STM with muscle stick L quad x 5 min Supine: STM with muscle stick L quad x 5 min Supine: STM with muscle stick L quad x 5 min RTB lateral and monster band walking 35ft x 2 ea   Reformer 4 bands double knee extension  Feet apart x20  Feet together x20  Feet in V x20 Mulligan left hip mobilizations Gr 4  lateral and inferior mobilizations 5 x10 sec bouts  L hip long axis distraction 5 20 sec bouts --  Supine: STM with muscle stick R quad x 5 min   TRX squats x10 Prone femoral nerve glides x10 ea --     ASU 4\" step R/L LE lead x10 ea Hkly: pelvic tilt 10 x10 sec Hkly: pelvic tilt 10 x10 sec Hkly: pelvic tilt 10 x10 sec Hkly: pelvic tilt 10 x10 sec   Hkly: Pelvic tilt 10x10 sec  Hkly: LTR x10 ea Hkly: LTR x10 ea Hkly: LTR x10 ea Hkly: LTR x10 ea Hkly: LTR x10 ea   Manual piriformis stretch R  3x10 sec  Manual piriformis stretch R  3x10 sec Manual piriformis stretch R  3x10 sec Manual piriformis stretch R  3x10 sec   R SKTC 3x 20 sec R/L SKTC 3x 20 sec R/L SKTC 3x 20 sec- manual R/L SKTC 3x 20 sec- manual R/L SKTC 3x 20 sec- manual   Hkly:TrA bilat hip abd RTB 2x10 Hkly:TrA bilat hip abd RTB 2x10 Hkly:TrA bilat hip abd RTB 2x10 Hkly:TrA bilat hip abd RTB 2x10 Hkly:TrA bilat hip abd RTB 2x10   L/R s/l jack x10  L/R  s/l hip abd x10 Seated lumbar flexion stretch 5 x10 sec Seated lumbar flexion stretch 5 x10 sec Seated lumbar flexion stretch 5 x10 sec Seated lumbar flexion stretch 5 x10 sec   L s/l: R STM with muscle stick R ITB and gluteals x5 min  L s/l: R LP rotation mobilizations Gr 3 5 x10 sec bouts L s/l: R LP rotation mobilizations Gr 4 8 x10 sec bouts  Reformer 4 bands  Double knee ext with TrA 3x 10 reps ( head elevated) L s/l: R LP rotation mobilizations Gr 4 8 x10 sec bouts  STM R lat dorsi x8 min  Standing lumbar flexion off counter central and R bias 4 x10 sec ea   Seated chest stretch x10  Standing wall lat stretch   L s/l: R LP rotation mobilizations Gr 4 8 x10 sec bouts  STM R lat dorsi x8 min  Standing lumbar flexion off counter central and R bias 4 x10 sec ea   Seated chest stretch x10  Standing wall lat stretch 5 x 10 sec L s/l: R LP rotation mobilizations Gr 4 8 x10 sec bouts  STM lumbar paraspinals x8 min     HEP: Access Code: [email or web address]  URL: [email or web address]  Date: 11/07/2024  Prepared by: Eamon Pimentel     Exercises  - Supine Posterior Pelvic Tilt  - 3 x daily - 7 x weekly - 1 sets - 10 reps - 10 sec hold  - Supine Lower Trunk Rotation  - 3 x daily - 7 x weekly - 1 sets - 10 reps - 3 sec hold  - Supine Piriformis Stretch with Leg Straight  - 3 x daily - 7 x weekly - 1 sets - 3 reps - 10 sec hold  - Supine Single Knee to Chest Stretch  - 3 x daily - 7 x weekly - 1 sets - 5 reps - 10 sec hold  - Seated Lumbar Flexion Stretch  - 3 x daily - 7 x weekly - 1 sets - 5 reps - 10 hold  11/12/2024  Clamshell  - 3 x daily - 7 x weekly - 1 sets - 10 reps - 3 sec hold  - Sidelying Hip Abduction  - 3 x daily - 7 x weekly - 1 sets - 10 reps - 3 sec hold  12/10/2024  Latissimus Dorsi Stretch at Wall  - 1 x daily - 7 x weekly - 1 sets - 3 reps - 10 hold  - Standing Lumbar Spine Flexion Stretch Counter  - 3 x daily - 7 x weekly - 1 sets - 3 reps - 10 sec hold  - Seated Chest Stretch with Hands Behind  Head  - 1 x daily - 7 x weekly - 1 sets - 10 reps - 3-5 sec hold  Charges: Ex 1 (20) NReed 1 (25)   MT 1 (10)    Total Timed Treatment: 45 min  Total Treatment Time: 45 min

## 2024-12-31 ENCOUNTER — OFFICE VISIT (OUTPATIENT)
Dept: PHYSICAL THERAPY | Age: 49
End: 2024-12-31
Attending: FAMILY MEDICINE
Payer: COMMERCIAL

## 2024-12-31 PROCEDURE — 97110 THERAPEUTIC EXERCISES: CPT

## 2024-12-31 PROCEDURE — 97140 MANUAL THERAPY 1/> REGIONS: CPT

## 2024-12-31 PROCEDURE — 97112 NEUROMUSCULAR REEDUCATION: CPT

## 2024-12-31 NOTE — PROGRESS NOTES
Diagnosis:   Degeneration of intervertebral disc of lumbar region with discogenic back pain (M51.360)       Referring Provider: Jean Pierre Bright  Date of Evaluation:    11/7/2024    Precautions:  None Next MD visit:   none scheduled  Date of Surgery: n/a   Insurance Primary/Secondary: [employer] / N/A     # Auth Visits: 10 recommended            Subjective: no new problems. States that her lower back feels stiff not really painful today. Right thigh tightness has improved and doesn't feel it today. Has an appt with spine orthopedic at [facility] today.     Pain: 2/10 stiffness in the right LB, 0/10 in the left thigh knee, - right thigh tightness  4-5/10 pain at worst    Objective:   Lumbar AROM: (* denotes performed with pain)  Flexion: 95 deg  Extension: 35 deg*  Sidebending: R 40 deg; L 35 deg*--pain on R  Rotation: R 50%*; L 50%     Accessory motion: Pain with CPA T12-L1-2-3  Palpation: mild tenderness on palpation of right lumbar paraspinals and gluteals, mild tenderness R thoracolumbar paraspinals/lat dorsi     Strength: (* denotes performed with pain)  LE   Hip flexion (L2): R 5/5; L 5/5  Hip abduction: R 3-/5; L 3-/5  Hip Extension: R 3-/5; L 3-/5            Hip ER: R 4-/5; L 4-/5  Hip IR: R 5/5; L 55/5  Knee Flexion: R 5/5; L 5/5            Knee extension (L3): R 5/5; L 5/5             Assessment: completed todays treatment without increases in pain or discomfort. Lumbar paraspinal and gluteal tenderness minimal to day on palpation. Hip strength is improving. Good tolerance for LP stabilization. Educated on importance of LP stabilization for helping with lifting, bendng and twisting. Pt states understanding.        Goals:    (to be met in 10 visits)   Pt will improve transversus abdominis recruitment to perform proper isometric contraction without requiring verbal or tactile cuing to promote advancement of therex   Pt will demonstrate good understanding of proper posture and body mechanics to  decrease pain and improve spinal safety   Pt to report ability to sleep through the night with minimal LBP   Pt to reports ability to bend forward with minimal to no LBP   Pt will demonstrate improved hip abd and ER strength to 4+/5 to improve ease with work duties   Pt will be independent and compliant with comprehensive HEP to maintain progress achieved in PT    Plan: continue POC. Progress as tolerated.   Date: 12/10/2024                TX#: 4/10 Date: 12/26/2024                TX#: 5/10 Date: 12/30/2024  Tx#: 6/10 Date: 12/31/2024  Tx# 7/10   NU step L4 x5 min NU step L4 x5 min NU step L4 x5 min NU step L4 x5 min   Supine: STM with muscle stick L quad x 5 min Supine: STM with muscle stick L quad x 5 min RTB lateral and monster band walking 35ft x 2 ea RTB lateral and monster band walking 35ft x 2 ea   --  Supine: STM with muscle stick R quad x 5 min Supine: STM with muscle stick R quad x 5 min   --   Reformer: 4 bands double knee ext with TrA 3x20 reps   Hkly: pelvic tilt 10 x10 sec Hkly: pelvic tilt 10 x10 sec Hkly: pelvic tilt 10 x10 sec Hkly: pelvic tilt 10 x10 sec   Hkly: LTR x10 ea Hkly: LTR x10 ea Hkly: LTR x10 ea Hkly: LTR x10 ea   Manual piriformis stretch R  3x10 sec Manual piriformis stretch R  3x10 sec Manual piriformis stretch R  3x10 sec Manual piriformis stretch R  3x10 sec   R/L SKTC 3x 20 sec- manual R/L SKTC 3x 20 sec- manual R/L SKTC 3x 20 sec- manual R/L SKTC 3x 20 sec- manual   Hkly:TrA bilat hip abd RTB 2x10 Hkly:TrA bilat hip abd RTB 2x10 Hkly:TrA bilat hip abd RTB 2x10 Hkly:TrA bilat hip abd RTB 2x10   Seated lumbar flexion stretch 5 x10 sec Seated lumbar flexion stretch 5 x10 sec Seated lumbar flexion stretch 5 x10 sec Seated lumbar flexion stretch 5 x10 sec   L s/l: R LP rotation mobilizations Gr 4 8 x10 sec bouts  STM R lat dorsi x8 min  Standing lumbar flexion off counter central and R bias 4 x10 sec ea   Seated chest stretch x10  Standing wall lat stretch   L s/l: R LP rotation  mobilizations Gr 4 8 x10 sec bouts  STM R lat dorsi x8 min  Standing lumbar flexion off counter central and R bias 4 x10 sec ea   Seated chest stretch x10  Standing wall lat stretch 5 x 10 sec L s/l: R LP rotation mobilizations Gr 4 8 x10 sec bouts  STM lumbar paraspinals x8 min   L s/l: R LP rotation mobilizations Gr 4 8 x10 sec bouts  L/R s/l: clamshells x15  L/R s/l: hip abduction x10   HEP: Access Code: [email or web address]  URL: [email or web address]  Date: 11/07/2024  Prepared by: Eamon Pimentel     Exercises  - Supine Posterior Pelvic Tilt  - 3 x daily - 7 x weekly - 1 sets - 10 reps - 10 sec hold  - Supine Lower Trunk Rotation  - 3 x daily - 7 x weekly - 1 sets - 10 reps - 3 sec hold  - Supine Piriformis Stretch with Leg Straight  - 3 x daily - 7 x weekly - 1 sets - 3 reps - 10 sec hold  - Supine Single Knee to Chest Stretch  - 3 x daily - 7 x weekly - 1 sets - 5 reps - 10 sec hold  - Seated Lumbar Flexion Stretch  - 3 x daily - 7 x weekly - 1 sets - 5 reps - 10 hold  11/12/2024  Clamshell  - 3 x daily - 7 x weekly - 1 sets - 10 reps - 3 sec hold  - Sidelying Hip Abduction  - 3 x daily - 7 x weekly - 1 sets - 10 reps - 3 sec hold  12/10/2024  Latissimus Dorsi Stretch at Wall  - 1 x daily - 7 x weekly - 1 sets - 3 reps - 10 hold  - Standing Lumbar Spine Flexion Stretch Counter  - 3 x daily - 7 x weekly - 1 sets - 3 reps - 10 sec hold  - Seated Chest Stretch with Hands Behind Head  - 1 x daily - 7 x weekly - 1 sets - 10 reps - 3-5 sec hold  Charges: Ex 1 (20) NReed 1 (25)   MT 1 (10)    Total Timed Treatment: 45 min  Total Treatment Time: 45 min

## 2025-01-02 ENCOUNTER — APPOINTMENT (OUTPATIENT)
Dept: PHYSICAL THERAPY | Age: 50
End: 2025-01-02
Attending: FAMILY MEDICINE
Payer: COMMERCIAL

## 2025-01-07 ENCOUNTER — APPOINTMENT (OUTPATIENT)
Dept: PHYSICAL THERAPY | Age: 50
End: 2025-01-07
Attending: FAMILY MEDICINE
Payer: COMMERCIAL

## 2025-01-09 ENCOUNTER — APPOINTMENT (OUTPATIENT)
Dept: PHYSICAL THERAPY | Age: 50
End: 2025-01-09
Attending: FAMILY MEDICINE
Payer: COMMERCIAL

## 2025-01-14 ENCOUNTER — APPOINTMENT (OUTPATIENT)
Dept: PHYSICAL THERAPY | Age: 50
End: 2025-01-14
Attending: FAMILY MEDICINE
Payer: COMMERCIAL

## 2025-02-17 ENCOUNTER — LAB ENCOUNTER (OUTPATIENT)
Dept: LAB | Age: 50
End: 2025-02-17
Attending: FAMILY MEDICINE
Payer: COMMERCIAL

## 2025-02-17 ENCOUNTER — OFFICE VISIT (OUTPATIENT)
Dept: FAMILY MEDICINE CLINIC | Facility: CLINIC | Age: 50
End: 2025-02-17
Payer: COMMERCIAL

## 2025-02-17 VITALS
HEIGHT: 60 IN | RESPIRATION RATE: 16 BRPM | DIASTOLIC BLOOD PRESSURE: 84 MMHG | HEART RATE: 92 BPM | BODY MASS INDEX: 34.99 KG/M2 | OXYGEN SATURATION: 98 % | SYSTOLIC BLOOD PRESSURE: 126 MMHG | TEMPERATURE: 98 F | WEIGHT: 178.25 LBS

## 2025-02-17 DIAGNOSIS — N92.6 IRREGULAR MENSES: ICD-10-CM

## 2025-02-17 DIAGNOSIS — N92.6 IRREGULAR MENSTRUAL BLEEDING: ICD-10-CM

## 2025-02-17 DIAGNOSIS — M51.360 DEGENERATION OF INTERVERTEBRAL DISC OF LUMBAR REGION WITH DISCOGENIC BACK PAIN: Primary | ICD-10-CM

## 2025-02-17 DIAGNOSIS — Z71.84 TRAVEL ADVICE ENCOUNTER: ICD-10-CM

## 2025-02-17 DIAGNOSIS — Z87.42 HX OF MENORRHAGIA: ICD-10-CM

## 2025-02-17 DIAGNOSIS — D50.9 IRON DEFICIENCY ANEMIA, UNSPECIFIED IRON DEFICIENCY ANEMIA TYPE: ICD-10-CM

## 2025-02-17 DIAGNOSIS — D50.8 OTHER IRON DEFICIENCY ANEMIA: ICD-10-CM

## 2025-02-17 DIAGNOSIS — Z00.00 LABORATORY EXAM ORDERED AS PART OF ROUTINE GENERAL MEDICAL EXAMINATION: ICD-10-CM

## 2025-02-17 DIAGNOSIS — Z23 NEED FOR VACCINATION: ICD-10-CM

## 2025-02-17 DIAGNOSIS — Z83.3 FAMILY HISTORY OF DIABETES MELLITUS: ICD-10-CM

## 2025-02-17 PROBLEM — M47.816 LUMBAR SPONDYLOSIS: Status: ACTIVE | Noted: 2024-12-31

## 2025-02-17 PROBLEM — M54.16 LUMBAR RADICULOPATHY: Status: ACTIVE | Noted: 2024-12-31

## 2025-02-17 LAB
CONTROL LINE PRESENT WITH A CLEAR BACKGROUND (YES/NO): YES YES/NO
DEPRECATED HBV CORE AB SER IA-ACNC: 6 NG/ML
EST. AVERAGE GLUCOSE BLD GHB EST-MCNC: 126 MG/DL (ref 68–126)
HBA1C MFR BLD: 6 % (ref ?–5.7)
KIT LOT #: NORMAL NUMERIC
PREGNANCY TEST, URINE: NEGATIVE
TSI SER-ACNC: 0.75 UIU/ML (ref 0.55–4.78)

## 2025-02-17 PROCEDURE — 36415 COLL VENOUS BLD VENIPUNCTURE: CPT

## 2025-02-17 PROCEDURE — 82728 ASSAY OF FERRITIN: CPT

## 2025-02-17 PROCEDURE — 90471 IMMUNIZATION ADMIN: CPT | Performed by: FAMILY MEDICINE

## 2025-02-17 PROCEDURE — 99213 OFFICE O/P EST LOW 20 MIN: CPT | Performed by: FAMILY MEDICINE

## 2025-02-17 PROCEDURE — 81025 URINE PREGNANCY TEST: CPT | Performed by: FAMILY MEDICINE

## 2025-02-17 PROCEDURE — 84443 ASSAY THYROID STIM HORMONE: CPT

## 2025-02-17 PROCEDURE — 83036 HEMOGLOBIN GLYCOSYLATED A1C: CPT

## 2025-02-17 PROCEDURE — 90734 MENACWYD/MENACWYCRM VACC IM: CPT | Performed by: FAMILY MEDICINE

## 2025-02-17 RX ORDER — MELOXICAM 15 MG/1
15 TABLET ORAL DAILY
COMMUNITY
Start: 2024-12-31 | End: 2025-02-17 | Stop reason: ALTCHOICE

## 2025-02-17 NOTE — PROGRESS NOTES
HPI:   Faina Jones is a 49 year old female who is here for complaints of back pain and irregular menses.  C/o low back pain ongoing for months now. Tried PT with mild relief. Tried an oral steroid burst, which helped. Denies any radicular symptoms, paresthesias or weakness.   Menses have been irregular. Stopped Slynd since 2024 and has not had a period since then. Hx of uterine fibroid, following with gyne and has upcoming appt. LMP 24. Will be going on a [place] trip[place] on [place] 1st and does not want to be on her period.      Current Outpatient Medications   Medication Sig Dispense Refill    FERROUS GLUCONATE IRON OR       Norethindrone (DENIS-BE) 0.35 MG Oral Tab Take 1 tablet (0.35 mg total) by mouth daily. 28 tablet 12    lidocaine 5 % External Patch Place 1 patch onto the skin daily. (Patient not taking: Reported on 2025) 30 each 0    Drospirenone (SLYND) 4 MG Oral Tab Take 4 mg by mouth daily. (Patient not taking: Reported on 2025) 28 tablet 12     Past Medical History:    Iron deficiency anemia    Obesity       Past Surgical History:   Procedure Laterality Date      ,,    D & c      Hc  section level i      Rotator cuff repair Left        Family History   Problem Relation Age of Onset    Hypertension Father     Diabetes Father     Lipids Father     Hypertension Mother     Cancer Maternal Grandmother      SOCIAL HISTORY:  Social History     Socioeconomic History    Marital status:    Tobacco Use    Smoking status: Never     Passive exposure: Never    Smokeless tobacco: Never   Vaping Use    Vaping status: Never Used   Substance and Sexual Activity    Alcohol use: Never    Drug use: Never    Sexual activity: Yes     Partners: Male     Birth control/protection: Condom   Other Topics Concern    Caffeine Concern No    Exercise No    Seat Belt No    Special Diet No    Stress Concern No    Weight Concern No        REVIEW OF SYSTEMS:   GENERAL: feels well  otherwise  SKIN: denies any unusual skin lesions  LUNGS: denies shortness of breath with exertion  CARDIOVASCULAR: denies chest pain on exertion  GI: denies abdominal pain,denies heartburn  MUSCULOSKELETAL: no other joints are affected    EXAM:   /84   Pulse 92   Temp 97.8 °F (36.6 °C) (Temporal)   Resp 16   Ht 5' (1.524 m)   Wt 178 lb 4 oz (80.9 kg)   LMP 11/17/2024 (Exact Date)   SpO2 98%   BMI 34.81 kg/m²    GENERAL: well developed, well nourished,in no apparent , obese  SKIN: no rashes,no suspicious lesions  NECK: supple   LUNGS: clear to auscultation, no w/r/r  CARDIO: RRR without murmur  GI: good BS's,no masses, HSM or tenderness  EXTREMITIES: no cyanosis, clubbing or edema  BACK: lumbosacral tenderness, no sciatic tenderness, DTR's are 2+ bilaterally, strength is 5+/5, sensation is intact, neg straight leg bilat    ASSESSMENT/PLAN:   Faina Jones is a 49 year old female who presents with:  1. Degeneration of intervertebral disc of lumbar region with discogenic back pain  - advised to cont symptomatic tx: ice/heat, tylenol/nsaids prn and HEP  - monitor    2. Irregular menses  3. Hx of menorrhagia  - urine hcg: neg  - discussed tx options to delay menses for upcoming [place] trip  - has upcoming appt with gyne and will discuss further with her  - Urine Preg Test    4. Travel advice encounter  5. Need for vaccination  - Menveo - Meningococcal 10-55 years [06291]      The patient indicates understanding of these issues and agrees to the plan.  The patient is asked to return if sx's persist or worsen.

## 2025-02-25 ENCOUNTER — OFFICE VISIT (OUTPATIENT)
Facility: CLINIC | Age: 50
End: 2025-02-25
Payer: COMMERCIAL

## 2025-02-25 VITALS
SYSTOLIC BLOOD PRESSURE: 134 MMHG | HEART RATE: 100 BPM | HEIGHT: 60 IN | WEIGHT: 177.63 LBS | DIASTOLIC BLOOD PRESSURE: 80 MMHG | BODY MASS INDEX: 34.87 KG/M2

## 2025-02-25 DIAGNOSIS — N92.6 MISSED MENSES: Primary | ICD-10-CM

## 2025-02-25 PROCEDURE — 81025 URINE PREGNANCY TEST: CPT | Performed by: OBSTETRICS & GYNECOLOGY

## 2025-02-25 PROCEDURE — 99212 OFFICE O/P EST SF 10 MIN: CPT | Performed by: OBSTETRICS & GYNECOLOGY

## 2025-02-25 RX ORDER — ACETAMINOPHEN AND CODEINE PHOSPHATE 120; 12 MG/5ML; MG/5ML
0.35 SOLUTION ORAL DAILY
Qty: 28 TABLET | Refills: 12 | Status: SHIPPED | OUTPATIENT
Start: 2025-02-25 | End: 2026-02-25

## 2025-02-25 NOTE — PROGRESS NOTES
She started the Slynd in August she would have some brown bleeding for 4 days.  Then she developed severe back pain she went to the emergency room had an MRI and was told by her orthopedic that she has as slipped disc when she stopped the Slynd the pain went away.  She is not have any bleeding since she stopped.  No hot flashes or night sweats she is using condoms.  She wishes to go on a [employer] trip in May and cannot be on her.  Will try Tatiana-BE

## 2025-03-19 ENCOUNTER — MED REC SCAN ONLY (OUTPATIENT)
Dept: FAMILY MEDICINE CLINIC | Facility: CLINIC | Age: 50
End: 2025-03-19

## 2025-04-02 ENCOUNTER — LAB ENCOUNTER (OUTPATIENT)
Dept: LAB | Age: 50
End: 2025-04-02
Attending: FAMILY MEDICINE
Payer: COMMERCIAL

## 2025-04-02 ENCOUNTER — OFFICE VISIT (OUTPATIENT)
Dept: FAMILY MEDICINE CLINIC | Facility: CLINIC | Age: 50
End: 2025-04-02
Payer: COMMERCIAL

## 2025-04-02 VITALS
RESPIRATION RATE: 16 BRPM | BODY MASS INDEX: 34.16 KG/M2 | TEMPERATURE: 98 F | WEIGHT: 174 LBS | SYSTOLIC BLOOD PRESSURE: 124 MMHG | HEIGHT: 60 IN | OXYGEN SATURATION: 99 % | HEART RATE: 90 BPM | DIASTOLIC BLOOD PRESSURE: 80 MMHG

## 2025-04-02 DIAGNOSIS — M16.0 PRIMARY OSTEOARTHRITIS OF BOTH HIPS: ICD-10-CM

## 2025-04-02 DIAGNOSIS — Z00.00 LABORATORY EXAM ORDERED AS PART OF ROUTINE GENERAL MEDICAL EXAMINATION: ICD-10-CM

## 2025-04-02 DIAGNOSIS — Z01.84 IMMUNITY STATUS TESTING: ICD-10-CM

## 2025-04-02 DIAGNOSIS — Z71.84 TRAVEL ADVICE ENCOUNTER: Primary | ICD-10-CM

## 2025-04-02 DIAGNOSIS — N92.6 IRREGULAR MENSTRUAL BLEEDING: ICD-10-CM

## 2025-04-02 DIAGNOSIS — R79.0 LOW FERRITIN: ICD-10-CM

## 2025-04-02 DIAGNOSIS — R73.03 PREDIABETES: ICD-10-CM

## 2025-04-02 DIAGNOSIS — M51.360 DEGENERATION OF INTERVERTEBRAL DISC OF LUMBAR REGION WITH DISCOGENIC BACK PAIN: ICD-10-CM

## 2025-04-02 DIAGNOSIS — D50.9 IRON DEFICIENCY ANEMIA, UNSPECIFIED IRON DEFICIENCY ANEMIA TYPE: ICD-10-CM

## 2025-04-02 LAB
RUBV IGG SER QL: POSITIVE
RUBV IGG SER-ACNC: 105 IU/ML (ref 10–?)

## 2025-04-02 PROCEDURE — 86765 RUBEOLA ANTIBODY: CPT

## 2025-04-02 PROCEDURE — 86762 RUBELLA ANTIBODY: CPT

## 2025-04-02 PROCEDURE — 36415 COLL VENOUS BLD VENIPUNCTURE: CPT

## 2025-04-02 PROCEDURE — 86735 MUMPS ANTIBODY: CPT

## 2025-04-02 PROCEDURE — 99214 OFFICE O/P EST MOD 30 MIN: CPT | Performed by: FAMILY MEDICINE

## 2025-04-02 RX ORDER — MELOXICAM 15 MG/1
15 TABLET ORAL DAILY
COMMUNITY
Start: 2025-03-12

## 2025-04-02 RX ORDER — METHYLPREDNISOLONE 4 MG/1
4 TABLET ORAL AS DIRECTED
COMMUNITY
Start: 2025-03-12

## 2025-04-02 RX ORDER — AZITHROMYCIN 250 MG/1
TABLET, FILM COATED ORAL
Qty: 6 TABLET | Refills: 0 | Status: SHIPPED | OUTPATIENT
Start: 2025-04-02 | End: 2025-04-07

## 2025-04-02 NOTE — PROGRESS NOTES
Subjective:   Faina Jones is a 49 year old female who presents for Medication Follow-Up (Dr. Bright would like to use a new tool that securely records the visit.  This will help her write her notes, so she can pay closer attention to you and less time on the computer/The following individual(s) verbally consented to be recorded using ambient AI listening technology and understand that they can each withdraw their consent to this listening technology at any point by asking the clinician to turn off or pause the recording://Patient name: Faina Jones /Additional names:   // )     History of Present Illness  Faina Jones is a 49 year old female who presents for follow-up on multiple health issues including low ferritin, prediabetes, and osteoarthritis.    She is experiencing perimenopausal symptoms and is on a low-dose medication (norethindrone) to manage these symptoms by her gyne. She has not experienced any bleeding since starting the medication.      Her ferritin levels are very low, and due to the absence of bleeding, she is taking iron supplements twice a week.    Her A1c is 6.0, indicating prediabetes. She is reducing carbohydrate intake and has been losing weight, having lost four pounds recently. She attributes a previous increase in blood sugar levels to a course of prednisone prescribed by her orthopedic.     She has a history of moderate osteoarthritis in the right hip, confirmed by an x-ray report. She was advised to undergo an MRI to rule out avascular necrosis but opted to delay any new treatments prior to her upcoming [place] trip.     She is concerned about her immunity status regarding measles, mumps, and rubella (MMR) as she had low rubella titers in the past. She recalls having her titers checked 8-9 years ago during her immigration process.    She requested an antibiotic for prophylaxis for her upcoming trip.    She is preparing for a trip and discussed general health maintenance, including  wearing a mask to prevent illness during travel.      History/Other:    Chief Complaint Reviewed and Verified  Nursing Notes Reviewed and   Verified  Tobacco Reviewed  Allergies Reviewed  Medications Reviewed    Problem List Reviewed  OB Status Reviewed         Tobacco:  She has never smoked tobacco.    Current Outpatient Medications   Medication Sig Dispense Refill    methylPREDNISolone 4 MG Oral Tablet Therapy Pack Take 1 tablet (4 mg total) by mouth As Directed. FOLLOW DIRECTIONS ON PACKAGING      Meloxicam 15 MG Oral Tab Take 1 tablet (15 mg total) by mouth daily.      azithromycin 250 MG Oral Tab Take 2 tablets (500 mg total) by mouth daily for 1 day, THEN 1 tablet (250 mg total) daily for 4 days. 6 tablet 0    Norethindrone (DENIS-BE) 0.35 MG Oral Tab Take 1 tablet (0.35 mg total) by mouth daily. 28 tablet 12    FERROUS GLUCONATE IRON OR 27 mg.           Review of Systems:  Review of Systems   Constitutional:  Negative for fatigue.   Genitourinary:  Positive for menstrual problem.   Musculoskeletal:  Positive for arthralgias and back pain.   Neurological:  Negative for weakness and numbness.       Objective:   /80   Pulse 90   Temp 97.9 °F (36.6 °C) (Temporal)   Resp 16   Ht 5' (1.524 m)   Wt 174 lb (78.9 kg)   LMP 11/29/2024 (Approximate)   SpO2 99%   BMI 33.98 kg/m²  Estimated body mass index is 33.98 kg/m² as calculated from the following:    Height as of this encounter: 5' (1.524 m).    Weight as of this encounter: 174 lb (78.9 kg).  Physical Exam  Vitals and nursing note reviewed.   Constitutional:       Appearance: Normal appearance. She is obese.   HENT:      Head: Normocephalic and atraumatic.   Cardiovascular:      Rate and Rhythm: Normal rate and regular rhythm.   Pulmonary:      Effort: Pulmonary effort is normal.      Breath sounds: Normal breath sounds. No wheezing, rhonchi or rales.   Skin:     General: Skin is warm and dry.   Neurological:      Mental Status: She is alert and  oriented to person, place, and time.   Psychiatric:         Mood and Affect: Mood normal.         Behavior: Behavior normal.         Assessment & Plan:   1. Travel advice encounter  - discussed safety and health precautions for her upcoming trip  - will provide safety rx for z-pack, reviewed indications, dosing and SEs  - azithromycin 250 MG Oral Tab; Take 2 tablets (500 mg total) by mouth daily for 1 day, THEN 1 tablet (250 mg total) daily for 4 days.  Dispense: 6 tablet; Refill: 0    2. Immunity status testing  Concern about measles immunity due to increased cases. Previous titers showed low rubella immunity. MMR booster may have been received.  - Order MMR titers.  - Administer MMR vaccine if titers are low.  - MMR Panel([place] Immunity Panel); Future    3. Degeneration of intervertebral disc of lumbar region with discogenic back pain  4. Primary osteoarthritis of both hips  - cont symptomatic tx  - following with ortho  - cpm  - Meloxicam 15 MG Oral Tab; Take 1 tablet (15 mg total) by mouth daily.    5. Prediabetes  A1c of 6.0 indicates prediabetes. Weight loss is beneficial. Previous prednisone use may have elevated blood sugar levels. Focus on lifestyle modifications.  - Continue weight loss efforts.  - Reduce carbohydrate intake.  - Increase physical activity.    6. Low ferritin  Ferritin levels are very low without current bleeding.    - Take ferrous gluconate iron twice a week.  - Reassess ferritin levels after returning from travel    She understands and agrees with tx plan  RTC in 2-3mo, sooner if needed.

## 2025-04-04 LAB
MEV IGG SER-ACNC: 37.2 AU/ML (ref 16.5–?)
MUV IGG SER IA-ACNC: 43.5 AU/ML (ref 11–?)

## 2025-05-13 ENCOUNTER — PATIENT MESSAGE (OUTPATIENT)
Facility: CLINIC | Age: 50
End: 2025-05-13

## 2025-05-20 RX ORDER — MEDROXYPROGESTERONE ACETATE 10 MG
TABLET ORAL
Qty: 40 TABLET | Refills: 0 | Status: SHIPPED | OUTPATIENT
Start: 2025-05-20

## 2025-06-18 ENCOUNTER — OFFICE VISIT (OUTPATIENT)
Facility: CLINIC | Age: 50
End: 2025-06-18
Payer: COMMERCIAL

## 2025-06-18 VITALS
BODY MASS INDEX: 35.27 KG/M2 | SYSTOLIC BLOOD PRESSURE: 132 MMHG | WEIGHT: 179.63 LBS | HEART RATE: 80 BPM | HEIGHT: 60 IN | DIASTOLIC BLOOD PRESSURE: 80 MMHG

## 2025-06-18 DIAGNOSIS — N93.9 ABNORMAL UTERINE BLEEDING (AUB): Primary | ICD-10-CM

## 2025-06-18 PROCEDURE — 99212 OFFICE O/P EST SF 10 MIN: CPT | Performed by: OBSTETRICS & GYNECOLOGY

## 2025-06-18 RX ORDER — AMOXICILLIN AND CLAVULANATE POTASSIUM 500; 125 MG/1; MG/1
1 TABLET, FILM COATED ORAL 3 TIMES DAILY
COMMUNITY

## 2025-06-18 RX ORDER — ACETAMINOPHEN AND CODEINE PHOSPHATE 120; 12 MG/5ML; MG/5ML
0.35 SOLUTION ORAL DAILY
Qty: 28 TABLET | Refills: 12 | Status: SHIPPED | OUTPATIENT
Start: 2025-06-18 | End: 2025-06-18

## 2025-06-18 RX ORDER — ACETAMINOPHEN AND CODEINE PHOSPHATE 120; 12 MG/5ML; MG/5ML
0.35 SOLUTION ORAL DAILY
Qty: 28 TABLET | Refills: 12 | Status: SHIPPED | OUTPATIENT
Start: 2025-06-18 | End: 2026-06-18

## 2025-06-18 NOTE — PROGRESS NOTES
She has a new known fibroid her.  She used to be heavy she was going on a trip and needed to stop her.  She started taking progesterone only.  First she tried Slynd but she did not like how she felt on it then she went to the [facility] she had 1menses  In November that was 9 days nothing since then occasional hot flashes.  She will continue for the progesterone only pill for a year.  She did when she did have her period she called and was prescribed Provera but she did not take it.  Myfembree was discussed if she continues to have heavy periods.

## (undated) DIAGNOSIS — M67.912 ROTATOR CUFF DYSFUNCTION, LEFT: ICD-10-CM

## (undated) NOTE — LETTER
[facility] – [place] Department of [place]/GYN  After Care Instructions for Endometrial Biopsy      Biopsy Results   You will receive a phone call with your biopsy results in 7 business days.  If you have not received your results in 7 days, please contact our office.  The results of your biopsy will determine if further treatment will be necessary.    Bleeding   You may have some light bleeding or blackish clumpy discharge for several days after your biopsy.    Restrictions    You should avoid intercourse or tampon use for 1 day after your biopsy.    Pain    You may experience mild menstrual cramping after your biopsy.  You may use Ibuprofen, Aleve or Tylenol to relieve your discomfort.  If you experience severe or persistent pain contact our office.      If you have any additional questions, please call us at [phone].

## (undated) NOTE — LETTER
Date & Time: 10/22/2024, 1:12 PM  Patient: Faina Jones  Encounter Provider(s):    Sandy Redding MD Leiker, Brenna, PA       To Whom It May Concern:    Faina Jones was seen and treated in our department on 10/22/2024. She should not return to work until 10/26/24 .    If you have any questions or concerns, please do not hesitate to call.        _____________________________  Physician/APC Signature

## (undated) NOTE — LETTER
Date: 12/20/2023    Patient Name: Roberta Vila          To Whom it may concern: This letter has been written at the patient's request. The above patient was seen at the [facility] for treatment of a medical condition. This patient should be excused from attending work for the rest of the week, from Wed., 12/20/23 through Fri., 12/22/23.          Sincerely,    Marvin Ledesma DO

## (undated) NOTE — LETTER
[facility] – [place], [place] Department of OB/GYN  After Care Instructions for Endometrial Biopsy      Biopsy Results   You will receive a phone call with your biopsy results in 7 business days. If you have not received your results in 7 days, please contact our office. The results of your biopsy will determine if further treatment will be necessary. Bleeding   You may have some light bleeding or blackish clumpy discharge for several days after your biopsy. Restrictions    You should avoid intercourse or tampon use for 1 day after your biopsy. Pain    You may experience mild menstrual cramping after your biopsy. You may use Ibuprofen, Aleve or Tylenol to relieve your discomfort. If you experience severe or persistent pain contact our office. If you have any additional questions, please call us at [phone].